# Patient Record
Sex: FEMALE | Race: WHITE | NOT HISPANIC OR LATINO | Employment: FULL TIME | URBAN - METROPOLITAN AREA
[De-identification: names, ages, dates, MRNs, and addresses within clinical notes are randomized per-mention and may not be internally consistent; named-entity substitution may affect disease eponyms.]

---

## 2017-10-24 DIAGNOSIS — I47.20 VT (VENTRICULAR TACHYCARDIA): ICD-10-CM

## 2017-10-24 DIAGNOSIS — I45.10 COMPLETE RIGHT BUNDLE BRANCH BLOCK: Primary | ICD-10-CM

## 2017-10-24 DIAGNOSIS — I49.1 PAC (PREMATURE ATRIAL CONTRACTION): ICD-10-CM

## 2017-10-24 DIAGNOSIS — I49.3 PVC (PREMATURE VENTRICULAR CONTRACTION): ICD-10-CM

## 2017-10-25 ENCOUNTER — OFFICE VISIT (OUTPATIENT)
Dept: PEDIATRIC CARDIOLOGY | Facility: CLINIC | Age: 22
End: 2017-10-25
Payer: COMMERCIAL

## 2017-10-25 ENCOUNTER — CLINICAL SUPPORT (OUTPATIENT)
Dept: PEDIATRIC CARDIOLOGY | Facility: CLINIC | Age: 22
End: 2017-10-25
Payer: COMMERCIAL

## 2017-10-25 VITALS
HEART RATE: 60 BPM | HEIGHT: 63 IN | WEIGHT: 175.19 LBS | OXYGEN SATURATION: 98 % | DIASTOLIC BLOOD PRESSURE: 58 MMHG | BODY MASS INDEX: 31.04 KG/M2 | SYSTOLIC BLOOD PRESSURE: 105 MMHG | RESPIRATION RATE: 20 BRPM

## 2017-10-25 DIAGNOSIS — Z82.49 FAMILY HISTORY OF EARLY CAD: ICD-10-CM

## 2017-10-25 DIAGNOSIS — I49.1 PAC (PREMATURE ATRIAL CONTRACTION): ICD-10-CM

## 2017-10-25 DIAGNOSIS — Z82.49 FAMILY HISTORY OF HEART ATTACK: Primary | ICD-10-CM

## 2017-10-25 DIAGNOSIS — R00.2 PALPITATION: ICD-10-CM

## 2017-10-25 DIAGNOSIS — Z86.79 S/P RADIOFREQUENCY ABLATION OPERATION FOR ARRHYTHMIA: ICD-10-CM

## 2017-10-25 DIAGNOSIS — I47.20 VT (VENTRICULAR TACHYCARDIA): ICD-10-CM

## 2017-10-25 DIAGNOSIS — I45.10 COMPLETE RIGHT BUNDLE BRANCH BLOCK: ICD-10-CM

## 2017-10-25 DIAGNOSIS — I49.3 PVC (PREMATURE VENTRICULAR CONTRACTION): ICD-10-CM

## 2017-10-25 DIAGNOSIS — Z98.890 S/P RADIOFREQUENCY ABLATION OPERATION FOR ARRHYTHMIA: ICD-10-CM

## 2017-10-25 PROCEDURE — 93227 XTRNL ECG REC<48 HR R&I: CPT | Mod: S$GLB,,, | Performed by: PEDIATRICS

## 2017-10-25 PROCEDURE — 99215 OFFICE O/P EST HI 40 MIN: CPT | Mod: S$GLB,,, | Performed by: PHYSICIAN ASSISTANT

## 2017-10-25 PROCEDURE — 93000 ELECTROCARDIOGRAM COMPLETE: CPT | Mod: S$GLB,,, | Performed by: PEDIATRICS

## 2017-10-25 RX ORDER — DEXMETHYLPHENIDATE HYDROCHLORIDE 10 MG/1
10 CAPSULE, EXTENDED RELEASE ORAL DAILY
COMMUNITY
End: 2019-02-26

## 2017-10-25 NOTE — LETTER
October 26, 2017      Luciano Perez MD  [address]  [facility] [address]           [facility]  [address]  [facility] [address]  Phone: [phone]  Fax: [phone]          Patient: Belle Cid   MR Number: [id number]   YOB: 1995   Date of Visit: 10/25/2017       Dear Dr. Brooks Conroy:    Thank you for referring Belle Cid to me for evaluation. Attached you will find relevant portions of my assessment and plan of care.    If you have questions, please do not hesitate to call me. I look forward to following Belle Cid along with you.    Sincerely,    Harper Martinez PA-C    Enclosure  CC:  Guzman Currie MD    If you would like to receive this communication electronically, please contact [email or web address] or [phone] to request more information on [employer] Link access.    For providers and/or their staff who would like to refer a patient to [facility], please contact us through our one-stop-shop provider referral line, [facility], at [phone].    If you feel you have received this communication in error or would no longer like to receive these types of communications, please e-mail [email or web address]

## 2017-10-25 NOTE — PROGRESS NOTES
[facility]  Belle Cid  1995      Belle Cid is a 22 y.o. female presenting for follow-up of     VT (ventricular tachycardia)      Complete right bundle branch block      PVC (premature ventricular contraction)      .  Belle is here today unaccompanied.     HPI    Belle was noted to have frequent cardiac ectopy during physical exam for sinus surgery in May 2006. An echo and holter were obtained. Holter revealed a lot of premature beats (junctional vs PVCs) and one suspect 3 beat VT run. She was hospitalized and monitored, had PVCs > 800 per hour, and transferred to [facility]. She had EP study by Dr. Kellogg 8/22/06, diagnosed with RVOT VT, and had multiple lesions ablated with no PVCs noted post-ablation. She was hospitalized again May 2007 with 3 beat VT run on holter. May 2008, she experienced syncope with possible VT episode. She was transferred to [facility] for evaluation and EP study, which was performed 5/20/08 with 13 complete RF lesions > 20 seconds and partial success. Implantable loop recorder was implanted at that time.   Shortly after, she was admitted to [facility] with swelling of her right leg and evidence of an active clot in her thigh. She was anticoagulated with heparin and coumadin. 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 Puentes.     Lpa was elevated in 2013. It was recommended that she take OTC MegaRed. She has not been taking MegaRed.     She was last seen by Dr. Conroy 10/26/16 and there were no concerns reported that day. Her EKG showed CRBBB and Low voltage. Echo and holter were scheduled. She was asked to follow up in 1 year and see Dr. Currie in 1-2 months. Echo December 2016 showed normal findings. Holter December 2016 showed Sinus rhythm with heart rates varying between 41 and 110 bpm with an average of 62 bpm,  bundle branch block throughout, there were no PVCs, and there was no ventricular ectopic activity. She saw Dr. Currie on 12/18/16 and impression was h/o PVC's and VT s/p ablation attempt in past controlled on Sotalol without significant symptoms. Plan was Holter today, continue sotalol 40 mg qhs, continue with staying well hydrated, discuss with Dr. Conroy whether there is benefit to being on baby aspirin, will revisit concept of redo ablation attempt at some point in next 1-2 years, and F/u in EP clinic in 6 months with ECG at that time. She is late for follow up with Dr. Currie . She reports she initially took ASA but has not been taking it recently per report.     She followed by Faith Joshi at [facility] for ADHD on Focalin 10 mg M-F for the past year.     She has done well in past 4-5 years without palpations until September 2017. She developed palpations described as heart flutter, skipped beats, and pounding. She states she thinks she had an episode of VT on September 30th. She states she felt her heart racing while she was sitting on the couch. She thinks she had about a 10 second run on VT. No syncope, chest pain, ect with the palpations. She was drinking caffeine daily. She was also under a lot of stress during the time due to her  having surgery. She is now drinking decaf. She has only had 2 episodes of heart fluttering over the past 2 weeks since switching to decaf.      No history of syncope, fatigue, or chest pain. Denies any recent illness, surgeries, or hospitalizations.    There are no reports of chest pain, chest pain with exertion, cyanosis, exercise intolerance, dyspnea, fatigue, syncope and tachypnea. No other cardiovascular or medical concerns are reported.     Current Medications:   Previous Medications    DEXMETHYLPHENIDATE (FOCALIN XR) 10 MG 24 HR CAPSULE    Take 10 mg by mouth once daily.    NORETHINDRONE-E.ESTRADIOL-IRON (LO LOESTRIN FE ORAL)    Take 1 tablet by  mouth once daily.    SOTALOL (BETAPACE) 80 MG TABLET    Take 0.5 tablets (40 mg total) by mouth nightly.     Allergies:   Review of patient's allergies indicates:   Allergen Reactions    Dye        Family History   Problem Relation Age of Onset    No Known Problems Mother     Hypertension Father     Hypertension Maternal Grandmother     Heart attacks under age 50 Maternal Grandfather     Coronary artery disease Maternal Grandfather      bypass    Diabetes Maternal Grandfather     Hypertension Maternal Grandfather     Arrhythmia Maternal Aunt      PVCs    Arrhythmia Other      PVCs    Cardiomyopathy Neg Hx     Congenital heart disease Neg Hx     Early death Neg Hx     Long QT syndrome Neg Hx     Pacemaker/defibrilator Neg Hx      Past Medical History:   Diagnosis Date    Blood clot in vein     history of right femoral vein clot (2008)    Complete right bundle branch block     Family history of coronary artery disease     maternal grandfather    PAC (premature atrial contraction)     PVC (premature ventricular contraction)     VT (ventricular tachycardia)     s/p multiple ablations     Social History     Social History    Marital status:      Spouse name: N/A    Number of children: N/A    Years of education: N/A     Social History Main Topics    Smoking status: Never Smoker    Smokeless tobacco: None    Alcohol use No    Drug use: No    Sexual activity: Yes     Partners: Male     Other Topics Concern    None     Social History Narrative    She has 1 y/o baby.  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  Has 8 y/o step daughter as well.     Past Surgical History:   Procedure Laterality Date    CARDIAC ELECTROPHYSIOLOGY MAPPING AND ABLATION  05/20/2008    ACH: 13 complete RF lesions > 20 seconds and partial success    CARDIAC ELECTROPHYSIOLOGY MAPPING AND ABLATION  08/25/2006    [facility]: RVOT VT, and had multiple lesions ablated    LOOP RECORDER IMPLANT  05/20/2008  "   ACH: Loop recorder implant    LOOP RECORDER REMOVAL  01/2012    [facility]: Loop recorder removal       Past medical history, family history, surgical history, social history updated and reviewed today.     Review of Systems    GENERAL: No fever, chills, fatigability, malaise  or weight loss.  CHEST: Denies PARKS, cyanosis, wheezing, cough, sputum production or SOB.  CARDIOVASCULAR: + palpitations, Denies chest pain, diaphoresis, SOB, or reduced exercise tolerance.  Endocrine: Denies polyphagia, polydipsia, polyuria  Skin: Denies rashes or color change  HENT: Negative for congestion, headaches and sore throat.   ABDOMEN: Appetite fine. No weight loss. Denies diarrhea, abdominal pain, nausea or vomiting.  PERIPHERAL VASCULAR: No edema, varicosities, or cyanosis.  Musculoskeletal: Negative for muscle weakness and stiffness.  NEUROLOGIC: no dizziness, no history of syncope by report, no headache   Psychiatric/Behavioral: Negative for altered mental status. The patient is not nervous/anxious.   Allergic/Immunologic: Negative for environmental allergies.     Objective:   BP (!) 105/58 (BP Location: Right arm, Patient Position: Sitting, BP Method: Large (Automatic))   Pulse 60   Resp 20   Ht 5' 2.99" (1.6 m)   Wt 79.5 kg (175 lb 3 oz)   SpO2 98%   BMI 31.04 kg/m²     Physical Exam  GENERAL: Awake, well-developed well-nourished, no apparent distress  HEENT: mucous membranes moist and pink, normocephalic, no cranial or carotid bruits, sclera anicteric  NECK:  no lymphadenopathy  CHEST: Good air movement, clear to auscultation bilaterally  CARDIOVASCULAR: Quiet precordium, regular rate and rhythm, single S1, split S2, normal P2, No S3 or S4, no rubs or gallops. No clicks or rumbles. No cardiomegaly by palpation. No murmur noted. No PVCs noted on exam.   ABDOMEN: Soft, nontender nondistended, no hepatosplenomegaly, no aortic bruits  EXTREMITIES: Warm well perfused, 2+ radial/pedal/femoral, pulses, capillary refill 2 " seconds, no clubbing, cyanosis, or edema  NEURO: Alert and oriented, cooperative with exam, face symmetric, moves all extremities well.  Skin: pink, turgor WNL  Vitals reviewed     Tests:   Today's EKG interpretation by Dr. Conroy reveals:   NSR  CRBBB  Low voltage  abnormal  (Final report in electronic medical record)    Echocardiogram:   Pertinent Echocardiographic findings from the Echo dated 12/13/16 are:   There are 4 chambers with normally aligned great vessels.  Chamber sizes are qualitatively normal.  There is good LV function.  There are no shunts noted.  Physiological TR, PI, MR.  The right coronary artery and left coronary are patent by 2D.  RVSP 25 mm Hg  TAPSE WNL  LV tissue doppler WNL  LA Volumes WNL  Clinical Correlation Suggested  Follow Up Warranted  (Full report in electronic medical record)    Date of Procedure: 12/13/2016  PRE-TEST DATA   The diary was returned, but not completed.  TEST DESCRIPTION   PREDOMINANT RHYTHM  1. Sinus rhythm with heart rates varying between 41 and 110 bpm with an average of 62 bpm.   2. Bundle branch block throughout  VENTRICULAR ARRHYTHMIAS  1. There were no PVCs. There was no ventricular ectopic activity.  SUPRA VENTRICULAR ARRHYTHMIAS  1. There was no supraventricular ectopic activity.  SINUS NODE FUNCTION  1. There was no evidence of high grade SA radha block.   AV CONDUCTION  1. There was no evidence of high grade AV block.   DIARY  1. The diary was returned, but not completed  MISCELLANEOUS  1. This was a tape of adequate length (24 hrs).     Assessment:  Patient Active Problem List   Diagnosis    VT (ventricular tachycardia)    Complete right bundle branch block    PVC (premature ventricular contraction)    PAC (premature atrial contraction)    S/P radiofrequency ablation operation for arrhythmia    Palpitation    Family history of early CAD    Family history of heart attack       Discussion/ Plan:   Dr. Conroy reviewed history and physical exam. He then  performed the physical exam. He discussed the findings with the patient's caregiver(s), and answered all questions    Dr. Conroy and HYACINTH have reviewed our general guidelines related to cardiac issues with the family.  I instructed them in the event of an emergency to call 911 or go to the nearest emergency room.  They know to contact the PCP if problems arise or if they are in doubt.    Dr. Conroy would like to do a 48 hour holter monitor to get a PVC density due to her history of VT and new palpations. He would then like her to return next week for a 30 day event monitor since she reports and episode similar to VT. Dr. Conroy would like her to discontinue all caffeine products including decaf which still contains caffeine. He would like her continue her current dose of Sotalol. He would like her to follow up with Dr. Currie.  Because of her history of a clot,  Dr. Conroy would like her to take 81 mg ASA daily. Discussed that she should stop the ASA if she become pregnant. He would like to recheck her lipids and LPa which was elevated in the past and due to her family history of MI and early CAD. Dr. Conroy and I have discussed normal heart rate and rhythm, physiological tachycardia, and cardiac dysrhythmias. We have discussed red flags for dysrhythmias including sudden onset and sudden resolution, heart rates which wake her  up from sleep during the night, tachycardia associated with syncope or which lasts for a long time, and heart rates which are very high. In the event that Belle has loss of consciousness or is unresponsive, you should call 911, initiate CPR and notify parents or legal guardian. Discussed signs and symptoms to seek medical attention about when to alert us. Belle expressed understanding. Will consider repeating her echo at her next visit. Dr. Conryo would like to repeat the EKG at the next visit to monitor for changes.     I spent over 45 minutes with the patient. Over 50% of the time was spent counseling  the patient  on palpations, VT s/p ablation , PVC, CRBBB, Lipids, LPa, family history of CAD and MI      1. Activity: No strenuous activities at this time. Will revisit after studies are complete.       2. No endocarditis prophylaxis is recommended in this circumstance.     3. Medications:   Current Outpatient Prescriptions   Medication Sig    dexmethylphenidate (FOCALIN XR) 10 MG 24 hr capsule Take 10 mg by mouth once daily.    NORETHINDRONE-E.ESTRADIOL-IRON (LO LOESTRIN FE ORAL) Take 1 tablet by mouth once daily.    sotalol (BETAPACE) 80 MG tablet Take 0.5 tablets (40 mg total) by mouth nightly.     No current facility-administered medications for this visit.         4. Orders placed this encounter  Orders Placed This Encounter   Procedures    Lipid panel    Lipoprotein A (LPA)    Holter Monitor - 48 hour Pediatrics    Cardiac event monitor Pediatrics         Follow-Up:     Return to clinic in 6 months with EKG pending testing and f.u with Dr. Currie 1st available  or sooner if there are any concerns      Sincerely,  Brooks Conroy MD    Note Contributing Authors:  MD Harper Montero PA-C  10/26/2017    Attestation: Brooks Conroy MD    I have reviewed the records and agree with the above. I have examined the patient and discussed the findings with the family in attendance. All questions were answered to their satisfaction. I agree with the plan and the follow up instructions.

## 2017-10-25 NOTE — PATIENT INSTRUCTIONS
Brooks Conroy MD  Pediatric Cardiology  [address] [place] [address]  [phone]    General Guidelines    Name: Belle Cid                   : 1995    Diagnosis:   1. Complete right bundle branch block    2. VT (ventricular tachycardia)    3. PAC (premature atrial contraction)    4. PVC (premature ventricular contraction)    5. Palpitation        PCP: Luciano Perez MD  PCP Phone Number: [phone]    · If you have an emergency or you think you have an emergency, go to the nearest emergency room!     · Breathing too fast, doesnt look right, consistently not eating well, your child needs to be checked. These are general indications that your child is not feeling well. This may be caused by anything, a stomach virus, an ear ache or heart disease, so please call Luciano Preez MD. If Luciano Perez MD thinks you need to be checked for your heart, they will let us know.     · If your child experiences a rapid or very slow heart rate and has the following symptoms, call Luciano Perez MD or go to the nearest emergency room.   · unexplained chest pain   · does not look right   · feels like they are going to pass out   · actually passes out for unexplained reasons   · weakness or fatigue   · shortness of breath  or breathing fast   · consistent poor feeding     · If your child experiences a rapid or very slow heart rate that lasts longer than 30 minutes call Luciano Perez MD or go to the nearest emergency room.     · If your child feels like they are going to pass out - have them sit down or lay down immediately. Raise the feet above the head (prop the feet on a chair or the wall) until the feeling passes. Slowly allow the child to sit, then stand. If the feeling returns, lay back down and start over.     It is very important that you notify Luciano Perez MD first. Luciano Perez MD or the ER Physician can reach Dr. Brooks Conroy at the office or through [facility] PICU at [phone] as needed.    Call our office ([phone]) one week after ALL tests for results.       Take 81 mg Asprin daily.   Call one week after lab work  48 hour holter today.   Follow up with Dr. Currie

## 2017-10-30 ENCOUNTER — CLINICAL SUPPORT (OUTPATIENT)
Dept: PEDIATRIC CARDIOLOGY | Facility: CLINIC | Age: 22
End: 2017-10-30
Payer: COMMERCIAL

## 2017-10-30 DIAGNOSIS — I47.20 VT (VENTRICULAR TACHYCARDIA): ICD-10-CM

## 2017-10-30 DIAGNOSIS — I49.1 PAC (PREMATURE ATRIAL CONTRACTION): ICD-10-CM

## 2017-10-30 DIAGNOSIS — I45.10 COMPLETE RIGHT BUNDLE BRANCH BLOCK: ICD-10-CM

## 2017-10-30 DIAGNOSIS — R00.2 PALPITATION: ICD-10-CM

## 2017-10-30 DIAGNOSIS — I49.3 PVC (PREMATURE VENTRICULAR CONTRACTION): ICD-10-CM

## 2017-10-30 PROCEDURE — 93268 ECG RECORD/REVIEW: CPT | Mod: S$GLB,,, | Performed by: PEDIATRICS

## 2017-10-31 DIAGNOSIS — I47.20 VT (VENTRICULAR TACHYCARDIA): Primary | ICD-10-CM

## 2017-11-06 ENCOUNTER — DOCUMENTATION ONLY (OUTPATIENT)
Dept: PEDIATRIC CARDIOLOGY | Facility: CLINIC | Age: 22
End: 2017-11-06

## 2017-11-06 NOTE — PROGRESS NOTES
Called to discuss holter and labs results.  She will restart OTC MegaRed and encouraged healthy lifestyle. Literature mailed. She is wearing her event monitor now. Will see Dr. Currie in 2 weeks. Will keep us updated.      1027/17  Cholesterol 164 WNL  Triglycerides 76 WNL  AHDL 48 WNL  .8 H (normal 0-100)  HDL risk factor 3.5 (normal 0-4.9) (average 4.44)  LPa 89 H (normal < 75)    Date of Procedure: 10/25/2017  PRE-TEST DATA   The diary was sparsely completed.  TEST DESCRIPTION   PREDOMINANT RHYTHM  1. Sinus rhythm with heart rates varying between 43 and 153 bpm with an average of 66 bpm.   2. Bundle branch block throughout  VENTRICULAR ARRHYTHMIAS  1. There were no PVCs. There was no ventricular ectopic activity.  SUPRA VENTRICULAR ARRHYTHMIAS  1. There were very rare PACs recorded totalling 2 and averaging less than 1 per hour.   2. There were no episodes of sustained supraventricular tachycardia.  SINUS NODE FUNCTION  1. There was no evidence of high grade SA radha block.   AV CONDUCTION  1. There was no evidence of high grade AV block.   DIARY  1. The diary was sparsely completed.   2. There were 2 episodes of heart fluttering reported. The corresponding rhythm strips revealed the following:             During event 1 (17:00 day 1) the rhythm was sinus rhythm at 66 bpm.             During event 2 (14:21 day 3) the rhythm was sinus rhythm at 63 bpm, with PACs.   3. There was 1 episode of heart fluttering/ skips beat reported. The corresponding rhythm strips revealed the following:             During event 1 (20:00 day 2) the rhythm was sinus rhythm at 85 bpm.   4. There was 1 episode of skips beat / hot flash reported. The corresponding rhythm strips revealed the following:             During event 1 (14:20 day 3) the rhythm was sinus rhythm at 73 bpm.   MISCELLANEOUS  1. This was a tape of adequate length (48 hrs).   This document has been electronically    SIGNED BY: Kim Serrato MD On:  11/02/2017 15:56

## 2017-11-20 ENCOUNTER — OFFICE VISIT (OUTPATIENT)
Dept: PEDIATRIC CARDIOLOGY | Facility: CLINIC | Age: 22
End: 2017-11-20
Payer: COMMERCIAL

## 2017-11-20 VITALS
HEART RATE: 50 BPM | BODY MASS INDEX: 30.75 KG/M2 | RESPIRATION RATE: 20 BRPM | SYSTOLIC BLOOD PRESSURE: 105 MMHG | WEIGHT: 173.56 LBS | HEIGHT: 63 IN | DIASTOLIC BLOOD PRESSURE: 58 MMHG | OXYGEN SATURATION: 99 %

## 2017-11-20 DIAGNOSIS — I49.1 PAC (PREMATURE ATRIAL CONTRACTION): ICD-10-CM

## 2017-11-20 DIAGNOSIS — Z86.79 S/P RADIOFREQUENCY ABLATION OPERATION FOR ARRHYTHMIA: Primary | ICD-10-CM

## 2017-11-20 DIAGNOSIS — I47.20 VT (VENTRICULAR TACHYCARDIA): ICD-10-CM

## 2017-11-20 DIAGNOSIS — I49.3 PVC (PREMATURE VENTRICULAR CONTRACTION): ICD-10-CM

## 2017-11-20 DIAGNOSIS — Z98.890 S/P RADIOFREQUENCY ABLATION OPERATION FOR ARRHYTHMIA: Primary | ICD-10-CM

## 2017-11-20 DIAGNOSIS — R00.2 PALPITATION: ICD-10-CM

## 2017-11-20 DIAGNOSIS — I45.10 COMPLETE RIGHT BUNDLE BRANCH BLOCK: ICD-10-CM

## 2017-11-20 PROCEDURE — 99214 OFFICE O/P EST MOD 30 MIN: CPT | Mod: 25,S$GLB,, | Performed by: PEDIATRICS

## 2017-11-20 PROCEDURE — 93000 ELECTROCARDIOGRAM COMPLETE: CPT | Mod: S$GLB,,, | Performed by: PEDIATRICS

## 2017-11-20 RX ORDER — SOTALOL HYDROCHLORIDE 80 MG/1
40 TABLET ORAL NIGHTLY
Qty: 45 TABLET | Refills: 3 | Status: SHIPPED | OUTPATIENT
Start: 2017-11-20 | End: 2019-02-06 | Stop reason: SDUPTHER

## 2017-11-20 NOTE — LETTER
November 20, 2017        Brooks Conroy MD  [address] - [facility]  [address]  [facility] [address]  Phone: [phone]  Fax: [phone]   Patient: Belle Cid   MR Number: [id number]   YOB: 1995   Date of Visit: 11/20/2017       Dear Dr. Conroy:    Thank you for referring Belle Cid to me for evaluation. Attached you will find relevant portions of my assessment and plan of care.    If you have questions, please do not hesitate to call me. I look forward to following Belle Cid along with you.    Sincerely,      Guzman Currie MD            CC  Luciano Perez MD    Enclosure

## 2017-11-20 NOTE — PROGRESS NOTES
Thank you for referring your patient Belle Cid to the electrophysiology clinic for consultation.  Please review my findings below.    CHIEF COMPLAINT: H/o PVC/VT ablation f/u.    HISTORY OF PRESENT ILLNESS:  21 y/o with history of PVC's and VT s/p ablation attempt by Dr. Kellogg.  She has subsequently been managed on Sotalol.      Belle was last seen by me in December 2016.  Felt like she was having fluttering from May -September. Had Holter in October that was normal. Presently wearing Event monitor. Pressed button couple times since been on. Feeling better now. Feels like it was stress related.  She finally feels like she is getting back to normal.  She has far fewer palpitations.  She has none in the past 2 weeks.   She has no dizziness, syncope, or near syncope.  She has good energy and good activity tolerance.  She has no difficulty breathing or chest pain.      REVIEW OF SYSTEMS:     GENERAL: No fever, chills, fatigability or weight loss.  SKIN: No rashes, itching or changes in color or texture of skin.  CHEST: Denies PARKS, cyanosis, wheezing, cough and sputum production.  CARDIOVASCULAR: see HPI  ABDOMEN: Appetite fine. No weight loss. Denies diarrhea, abdominal pain, or vomiting.  PERIPHERAL VASCULAR: No claudication or cyanosis.  MUSCULOSKELETAL: No joint stiffness or swelling.   NEUROLOGIC: No history of seizures,  alteration of gait or coordination.    PAST MEDICAL HISTORY:   Past Medical History:   Diagnosis Date    Blood clot in vein     history of right femoral vein clot (2008)    Complete right bundle branch block     Family history of coronary artery disease     maternal grandfather    PAC (premature atrial contraction)     PVC (premature ventricular contraction)     VT (ventricular tachycardia)     s/p multiple ablations           FAMILY HISTORY:   Family History   Problem Relation Age of Onset    No Known Problems Mother     Hypertension Father     Hypertension Maternal Grandmother      "Heart attacks under age 50 Maternal Grandfather     Coronary artery disease Maternal Grandfather      bypass    Diabetes Maternal Grandfather     Hypertension Maternal Grandfather     Arrhythmia Maternal Aunt      PVCs    Arrhythmia Other      PVCs    Cardiomyopathy Neg Hx     Congenital heart disease Neg Hx     Early death Neg Hx     Long QT syndrome Neg Hx     Pacemaker/defibrilator Neg Hx          SOCIAL HISTORY:   Social History     Social History    Marital status:      Spouse name: N/A    Number of children: N/A    Years of education: N/A     Occupational History    Not on file.     Social History Main Topics    Smoking status: Never Smoker    Smokeless tobacco: Not on file    Alcohol use No    Drug use: No    Sexual activity: Yes     Partners: Male     Other Topics Concern    Not on file     Social History Narrative    She has 3 y/o baby.  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  Has 8 y/o step daughter as well.       ALLERGIES:  Review of patient's allergies indicates:   Allergen Reactions    Dye        MEDICATIONS:    Current Outpatient Prescriptions:     dexmethylphenidate (FOCALIN XR) 10 MG 24 hr capsule, Take 10 mg by mouth once daily., Disp: , Rfl:     NORETHINDRONE-E.ESTRADIOL-IRON (LO LOESTRIN FE ORAL), Take 1 tablet by mouth once daily., Disp: , Rfl:     OMEGA-3 FATTY ACIDS/FISH OIL (OMEGA 3 FISH OIL ORAL), Take 1 capsule by mouth once daily., Disp: , Rfl:     sotalol (BETAPACE) 80 MG tablet, Take 0.5 tablets (40 mg total) by mouth nightly., Disp: 30 tablet, Rfl: 6      PHYSICAL EXAM:   Vitals:    11/20/17 1040   BP: (!) 105/58   BP Location: Right arm   Patient Position: Lying   BP Method: Large (Automatic)   Pulse: (!) 50   Resp: 20   SpO2: 99%   Weight: 78.7 kg (173 lb 9 oz)   Height: 5' 2.56" (1.589 m)         GENERAL: Awake, well-developed well-nourished, no apparent distress  HEENT: mucous membranes moist and pink, normocephalic atraumatic, no " cranial or carotid bruits, sclera anicteric  NECK: no jugular venous distention, no lymphadenopathy  CHEST: Good air movement, clear to auscultation bilaterally  CARDIOVASCULAR: Quiet precordium, regular rate and rhythm, S1S2, no murmurs rubs or gallops  ABDOMEN: Soft, nontender nondistended, no hepatomegaly, no aortic bruits  EXTREMITIES: Warm well perfused, 2+ radial/pedal pulses, capillary refill 2 seconds, no clubbing, cyanosis, or edema.  Right leg pain rarely with weather changes.  NEURO: Alert and oriented, cooperative with exam, face symmetric, moves all extremities well    STUDIES:  ECG:  Sinus bradycardia, right bundle branch block    ASSESSMENT:  Encounter Diagnoses   Name Primary?    VT (ventricular tachycardia)      21 y/o female with h/o PVC's and VT s/p ablation attempt in past.  She has been controlled on Sotalol without significant symptoms.      PLAN:   1. Place 3 day Holter today.  2. Continue sotalol 40 mg qhs, could increase to 80 if starts having more palpitations again.  3. Continue with staying well hydrated.  4. Will revisit concept of redo ablation attempt at some point in future but not ready for now.  6. F/u in EP clinic in 6 months with ECG at that time.  7. Call for worsening palpitations, chest pain, syncope, or any other questions or concerns in the interim.    Time Spent: 40 (min) with over 50% in direct patient consultation regarding management of PVC's and VT.      The patient's doctor will be notified via EPIC/FAX    I hope this brings you up-to-date on Belle Jose Roberto  Please contact me with any questions or concerns.    Guzman Currie MD  Pediatric and Adult Congenital Electrophysiologist  Pediatric Cardiologist

## 2019-02-08 RX ORDER — SOTALOL HYDROCHLORIDE 80 MG/1
TABLET ORAL
Qty: 45 TABLET | Refills: 3 | Status: SHIPPED | OUTPATIENT
Start: 2019-02-08 | End: 2020-04-01 | Stop reason: SDUPTHER

## 2019-02-26 ENCOUNTER — OFFICE VISIT (OUTPATIENT)
Dept: PEDIATRIC CARDIOLOGY | Facility: CLINIC | Age: 24
End: 2019-02-26
Payer: COMMERCIAL

## 2019-02-26 VITALS
OXYGEN SATURATION: 98 % | HEART RATE: 67 BPM | WEIGHT: 164.25 LBS | BODY MASS INDEX: 29.1 KG/M2 | HEIGHT: 63 IN | SYSTOLIC BLOOD PRESSURE: 110 MMHG | RESPIRATION RATE: 20 BRPM | DIASTOLIC BLOOD PRESSURE: 53 MMHG

## 2019-02-26 DIAGNOSIS — I47.20 VT (VENTRICULAR TACHYCARDIA): Primary | ICD-10-CM

## 2019-02-26 DIAGNOSIS — Z82.49 FAMILY HISTORY OF HEART ATTACK: ICD-10-CM

## 2019-02-26 DIAGNOSIS — Z3A.01 LESS THAN 8 WEEKS GESTATION OF PREGNANCY: ICD-10-CM

## 2019-02-26 DIAGNOSIS — Z82.49 FAMILY HISTORY OF EARLY CAD: ICD-10-CM

## 2019-02-26 DIAGNOSIS — Z86.79 S/P RADIOFREQUENCY ABLATION OPERATION FOR ARRHYTHMIA: ICD-10-CM

## 2019-02-26 DIAGNOSIS — R00.2 PALPITATION: ICD-10-CM

## 2019-02-26 DIAGNOSIS — Z98.890 S/P RADIOFREQUENCY ABLATION OPERATION FOR ARRHYTHMIA: ICD-10-CM

## 2019-02-26 DIAGNOSIS — I45.10 COMPLETE RIGHT BUNDLE BRANCH BLOCK: ICD-10-CM

## 2019-02-26 PROCEDURE — 93000 PR ELECTROCARDIOGRAM, COMPLETE: ICD-10-PCS | Mod: S$GLB,,, | Performed by: PEDIATRICS

## 2019-02-26 PROCEDURE — 99214 OFFICE O/P EST MOD 30 MIN: CPT | Mod: S$GLB,,, | Performed by: NURSE PRACTITIONER

## 2019-02-26 PROCEDURE — 99214 PR OFFICE/OUTPT VISIT, EST, LEVL IV, 30-39 MIN: ICD-10-PCS | Mod: S$GLB,,, | Performed by: NURSE PRACTITIONER

## 2019-02-26 PROCEDURE — 93000 ELECTROCARDIOGRAM COMPLETE: CPT | Mod: S$GLB,,, | Performed by: PEDIATRICS

## 2019-02-26 NOTE — LETTER
February 27, 2019      Luciano Perez MD  [address] [place], [place] [address] [place] [address]  [address]  [facility] [address]  Phone: [phone]  Fax: [phone]          Patient: Belle Cid   MR Number: [id number]   YOB: 1995   Date of Visit: 2/26/2019       Dear Dr. Luciano Perez:    Thank you for referring Belle Cid to me for evaluation. Attached you will find relevant portions of my assessment and plan of care.    If you have questions, please do not hesitate to call me. I look forward to following Belle Cid along with you.    Sincerely,    Levon Church, [place]    Enclosure  CC:  No Recipients    If you would like to receive this communication electronically, please contact [email or web address] or [phone] to request more information on [facility] access.    For providers and/or their staff who would like to refer a patient to [facility], please contact us through our one-stop-shop provider referral line, [facility], at [phone].    If you feel you have received this communication in error or would no longer like to receive these types of communications, please e-mail [email or web address]

## 2019-02-26 NOTE — PROGRESS NOTES
[facility]  Belle Cid  1995    Belle Cid is a 23 y.o. female presenting for follow-up of VT, CRBBB, and PVCs.  Belle is here today unaccompanied.  She is now 5 weeks pregnant.    ADILSON Odonnell was noted to have frequent cardiac ectopy during physical exam for sinus surgery in May 2006. An echo and holter were obtained. Holter revealed a lot of premature beats (junctional vs PVCs) and one suspect 3 beat VT run. She was hospitalized and monitored, had PVCs > 800 per hour, and transferred to [facility]. She had EP study by Dr. Kellogg 8/22/06, diagnosed with RVOT VT, and had multiple lesions ablated with no PVCs noted post-ablation. She was hospitalized again May 2007 with 3 beat VT run on holter. May 2008, she experienced syncope with possible VT episode. She was transferred to [facility] for evaluation and EP study, which was performed 5/20/08 with 13 complete RF lesions > 20 seconds and partial success. Implantable loop recorder was placed at that time.   Shortly after, she was admitted to [facility] with swelling of her right leg and evidence of an active clot in her thigh. She was anticoagulated with heparin and coumadin. 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 Loop recorder removed in January 2012 by Dr. Puentes.     She was last seen in October 2017 and at that time was doing well. She had done well without palpations until September 2017. She developed palpations described as heart flutter, skipped beats, and pounding. She stated she thinks she had an episode of VT on 9/30/2018. She stated she felt her heart racing while she was sitting on the couch. She thought she had about a 10 second run on VT. No syncope, chest pain, etc with the palpations. She was drinking caffeine daily. She was also under a lot of stress during the time due to her  having surgery and some family  issues.. She had switched to drinking decaf which has significantly decreased her symptoms.  Her exam that day revealed normal heart sounds and no murmur.  48 hr Holter was placed with plan for 30 day event monitor after the Holter.  She was asked to continue on the sotalol and take 81 mg of aspirin daily.  Lipids and LPA  She was asked to follow up with Dr. Currie.     LPA was elevated at 89.  She was asked to restart Clayton red and encouraged healthy lifestyle.  Holter was normal.  Event monitor was normal.  Sensed events were not associated with any dysrhythmias.    She saw Dr. Currie in November of 2017.  Impression:  22-year-old female with history of PVCs in VT status post ablation attempt in the past.  On sotalol without significant symptoms.  Plan:  3 day Holter, continue sotalol and consider increasing the dose if she has palpitations again, stay well hydrated.     Belle has been doing well since last visit. She is currently 5 weeks pregnant. She saw the obstetrician today. She took Sotalol through her previous pregnancy and they agreed to continue it.  Belle has a lot of energy and does not get short of breath with activity. She was seen in the [place] [facility] recently for palpitations that occurred after using a nasal decongestant. She has since stopped the medication and has improved.  She does complain of dizziness from position change or climbing stairs.  She is drinking 80-90 oz of bottle water now without any caffeine intake.  I encouraged her to add a fluid with electrolytes to see if this would improve her symptoms.    There are no reports of chest pain, chest pain with exertion, cyanosis, exercise intolerance, dyspnea, fatigue, palpitations, syncope and tachypnea. No other cardiovascular or medical concerns are reported.     Current Medications:   Current Outpatient Medications on File Prior to Visit   Medication Sig Dispense Refill    OMEGA-3 FATTY ACIDS/FISH OIL (OMEGA 3 FISH OIL ORAL) Take 1  capsule by mouth once daily.      sotalol (BETAPACE) 80 MG tablet TAKE 1/2 TABLET BY MOUTH nightly 45 tablet 3     No current facility-administered medications on file prior to visit.      Allergies:   Review of patient's allergies indicates:   Allergen Reactions    Dye          Family History   Problem Relation Age of Onset    Fibromyalgia Mother     Meniere's disease Mother     Hypertension Father     Hypertension Maternal Grandmother     Heart attacks under age 50 Maternal Grandfather     Coronary artery disease Maternal Grandfather         bypass    Diabetes Maternal Grandfather     Hypertension Maternal Grandfather     Arrhythmia Maternal Aunt         PVCs    Arrhythmia Other         PVCs    No Known Problems Child     Cardiomyopathy Neg Hx     Congenital heart disease Neg Hx     Early death Neg Hx     Long QT syndrome Neg Hx     Pacemaker/defibrilator Neg Hx      Past Medical History:   Diagnosis Date    Blood clot in vein     history of right femoral vein clot (2008)    Complete right bundle branch block     Family history of coronary artery disease     maternal grandfather    PAC (premature atrial contraction)     Palpitation     PVC (premature ventricular contraction)     VT (ventricular tachycardia)     s/p multiple ablations     Social History     Socioeconomic History    Marital status:      Spouse name: None    Number of children: None    Years of education: None    Highest education level: None   Social Needs    Financial resource strain: None    Food insecurity - worry: None    Food insecurity - inability: None    Transportation needs - medical: None    Transportation needs - non-medical: None   Occupational History    None   Tobacco Use    Smoking status: Never Smoker   Substance and Sexual Activity    Alcohol use: No    Drug use: No    Sexual activity: Yes     Partners: Male   Other Topics Concern    None   Social History Narrative    She has 3 y/o  "baby.  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  Has 7 y/o step daughter as well.     Past Surgical History:   Procedure Laterality Date    CARDIAC ELECTROPHYSIOLOGY MAPPING AND ABLATION  05/20/2008    ACH: 13 complete RF lesions > 20 seconds and partial success    CARDIAC ELECTROPHYSIOLOGY MAPPING AND ABLATION  08/25/2006    [facility]: RVOT VT, and had multiple lesions ablated    LOOP RECORDER IMPLANT  05/20/2008    ACH: Loop recorder implant    LOOP RECORDER REMOVAL  01/2012    [facility]: Loop recorder removal       Review of Systems    GENERAL: No fever, chills, fatigability, malaise  or weight loss.  CHEST: Denies dyspnea on exertion, cyanosis, wheezing, cough, sputum production   CARDIOVASCULAR: Denies chest pain, palpitations, diaphoresis,  or reduced exercise tolerance.  ABDOMEN: Appetite normal. Denies diarrhea, abdominal pain, nausea or vomiting.  PERIPHERAL VASCULAR: No edema, varicosities, or cyanosis.  NEUROLOGIC: no no syncope , no headache.  Occasional dizziness with position change and climbing stairs.  MUSCULOSKELETAL: Denies muscle weakness, joint pain  PSYCHOLOGICAL/BEHAVIORAL: Denies anxiety, severe stress, confusion  SKIN: no rashes, lesions  HEMATOLOGIC: Denies any abnormal bruising or bleeding, denies sickle cell trait or disease  ALLERGY/IMMUNOLOGIC: Denies any environmental allergies.     Objective:   BP (!) 110/53 (BP Location: Right arm, Patient Position: Lying, BP Method: Large (Automatic))   Pulse 67   Resp 20   Ht 5' 2.56" (1.589 m)   Wt 74.5 kg (164 lb 4 oz)   SpO2 98%   BMI 29.51 kg/m²     Physical Exam  GENERAL: Awake, well-developed well-nourished, no apparent distress  HEENT: mucous membranes moist and pink, normocephalic, no cranial or carotid bruits, sclera anicteric  CHEST: Good air movement, clear to auscultation bilaterally  CARDIOVASCULAR: Quiet precordium, regular rate and rhythm, single S1, split S2, normal P2, No S3 or S4, no rubs or gallops. No " clicks or rumbles. No cardiomegaly by palpation.  No functional organic murmur.  ABDOMEN: Soft, nontender nondistended, no hepatosplenomegaly, no aortic bruits  EXTREMITIES: Warm well perfused, 3+ brachial/femoral pulses, capillary refill <3 seconds, no clubbing, cyanosis, or edema  NEURO: Alert and oriented, cooperative with exam, face symmetric, moves all extremities well.    Tests:   Today's EKG interpretation by Dr. Conroy reveals:   Sinus Rhythm and CRBBB   Abnormal EKG  (Final report in electronic medical record)    Echocardiogram:   Pertinent findings from the Echo dated 12/13/2016 are:   There are 4 chambers with normally aligned great vessels.  Chamber sizes are qualitatively normal.  There is good LV function.  There are no shunts noted.  Physiological TR, PI, MR.  The right coronary artery and left coronary are patent by 2D.  RVSP 25 mm Hg  TAPSE WNL  LV tissue doppler WNL  LA Volumes WNL  Clinical Correlation Suggested  Follow Up Warranted  (Full report in electronic medical record)    Holter/Event results  Date of Procedure: 10/25/2017  PRE-TEST DATA   The diary was sparsely completed.  TEST DESCRIPTION   PREDOMINANT RHYTHM  1. Sinus rhythm with heart rates varying between 43 and 153 bpm with an average of 66 bpm.   2. Bundle branch block throughout  VENTRICULAR ARRHYTHMIAS  1. There were no PVCs. There was no ventricular ectopic activity.  SUPRA VENTRICULAR ARRHYTHMIAS  1. There were very rare PACs recorded totalling 2 and averaging less than 1 per hour.   2. There were no episodes of sustained supraventricular tachycardia.  SINUS NODE FUNCTION  1. There was no evidence of high grade SA radha block.   AV CONDUCTION  1. There was no evidence of high grade AV block.   DIARY  1. The diary was sparsely completed.   2. There were 2 episodes of heart fluttering reported. The corresponding rhythm strips revealed the following:             During event 1 (17:00 day 1) the rhythm was sinus rhythm at 66 bpm.              During event 2 (14:21 day 3) the rhythm was sinus rhythm at 63 bpm, with PACs.   3. There was 1 episode of heart fluttering/ skips beat reported. The corresponding rhythm strips revealed the following:             During event 1 (20:00 day 2) the rhythm was sinus rhythm at 85 bpm.   4. There was 1 episode of skips beat / hot flash reported. The corresponding rhythm strips revealed the following:             During event 1 (14:20 day 3) the rhythm was sinus rhythm at 73 bpm.   MISCELLANEOUS  1. This was a tape of adequate length (48 hrs).     Event monitor.   Date of Procedure: 10/30/2017  CONCLUSIONS   MCOT  10/30/17-11/29/17  Baseline intraventricular conduction delay noted throughout.  High  bpm, Low HR 50 bpm, Avg HR 67 bpm.  Symptoms of heart racing correlated with sinus rhythm or sinus rhythm with PVC. Symptom of skipped beat correlated with sinus rhythm.  Autotrigger episodes showed sinus tachy and sinus rhythm  No SVT, no VT, no Atrial flutter or fib noted.  No prolonged pauses noted.    Assessment:  1. VT (ventricular tachycardia)    2. Complete right bundle branch block    3. S/P radiofrequency ablation operation for arrhythmia    4. Palpitation    5. Family history of early CAD    6. Family history of heart attack    7. Less than 8 weeks gestation of pregnancy      Discussion/Plan:   Belle Cid is a 23 y.o. female with a history of PVCs in VT status post ablation attempt in the past.  She is currently controlled with sotalol without significant symptoms.  She is also currently pregnant.  We would like her to continue the sotalol as long as it is okay with her obstetrician.  We discussed possible side effects of beta-blocker use while pregnant and encourage regular OB visits with fetal growth measurements.  We encouraged her to call with any palpitations in would apply Holter monitor.  Plan to see her in 3 months unless needed sooner.  Plan to recheck her lipids after the pregnancy.    I have  reviewed our general guidelines related to cardiac issues with the family.  I instructed them in the event of an emergency to call 911 or go to the nearest emergency room.  They know to contact the PCP if problems arise or if they are in doubt.    Follow up with the primary care provider for the following issues: Nothing identified.    Activity:She can participate in normal age-appropriate activities. She should be allowed to set her own pace and rest if fatigued.    No endocarditis prophylaxis is recommended in this circumstance.     I spent over 30 minutes with the patient. Over 50% of the time was spent counseling the patient and family member.    Patient or family member was asked to call the office within 3 days of any testing for results.     Dr. Conroy reviewed history and physical exam. He then performed the physical exam. He discussed the findings with the patient's caregiver(s), and answered all questions. I have reviewed our general guidelines related to cardiac issues with the family. I instructed them in the event of an emergency to call 911 or go to the nearest emergency room. They know to contact the PCP if problems arise or if they are in doubt.    Medications:   Current Outpatient Medications   Medication Sig    OMEGA-3 FATTY ACIDS/FISH OIL (OMEGA 3 FISH OIL ORAL) Take 1 capsule by mouth once daily.    sotalol (BETAPACE) 80 MG tablet TAKE 1/2 TABLET BY MOUTH nightly     No current facility-administered medications for this visit.         Orders:   Orders Placed This Encounter   Procedures    EKG 12-lead     Follow-Up:     Return to clinic in 3 months with EKG or sooner if there are any concerns.       Sincerely,  Brooks Conroy MD    Note Contributing Authors:  MD Levon Montero FNP-C  This documentation was created using Bilibot voice recognition software. Content is subject to voice recognition errors.    02/27/2019    Attestation: Broosk Conroy MD    I have reviewed the records  and agree with the above. I have examined the patient and discussed the findings with the family in attendance. All questions were answered to their satisfaction. I agree with the plan and the follow up instructions.

## 2019-02-26 NOTE — PATIENT INSTRUCTIONS
Brooks Conroy MD  Pediatric Cardiology  [address] [place] [address]  [phone]    General Guidelines    Name: Belle Cid                   : 1995    Diagnosis:   1. VT (ventricular tachycardia)    2. Complete right bundle branch block    3. S/P radiofrequency ablation operation for arrhythmia    4. Palpitation    5. Family history of early CAD    6. Family history of heart attack    7. Less than 8 weeks gestation of pregnancy        PCP: Luciano Perez MD  PCP Phone Number: [phone]    · If you have an emergency or you think you have an emergency, go to the nearest emergency room!     · Breathing too fast, doesnt look right, consistently not eating well, your child needs to be checked. These are general indications that your child is not feeling well. This may be caused by anything, a stomach virus, an ear ache or heart disease, so please call Luciano Perez MD. If Luciano Perez MD thinks you need to be checked for your heart, they will let us know.     · If your child experiences a rapid or very slow heart rate and has the following symptoms, call Luciano Perez MD or go to the nearest emergency room.   · unexplained chest pain   · does not look right   · feels like they are going to pass out   · actually passes out for unexplained reasons   · weakness or fatigue   · shortness of breath  or breathing fast   · consistent poor feeding     · If your child experiences a rapid or very slow heart rate that lasts longer than 30 minutes call Luciano Perez MD or go to the nearest emergency room.     · If your child feels like they are going to pass out - have them sit down or lay down immediately. Raise the feet above the head (prop the feet on a chair or the wall) until the feeling passes. Slowly allow the child to sit, then stand. If the feeling returns, lay back down and start over.     It is very important that you notify Luciano Perez MD first. Luciano Perez MD  or the ER Physician can reach Dr. Brooks Conroy at the office or through [facility] at [phone] as needed.    Call our office ([phone]) one week after ALL tests for results.

## 2019-07-23 ENCOUNTER — TELEPHONE (OUTPATIENT)
Dept: PEDIATRIC CARDIOLOGY | Facility: CLINIC | Age: 24
End: 2019-07-23

## 2019-07-23 NOTE — TELEPHONE ENCOUNTER
Belle phoned and advised she was supposed to come back in 3 months from February d/t she was pregnant. Belle advised she had a miscarriage and needs to know when she needs to come back.

## 2019-07-23 NOTE — TELEPHONE ENCOUNTER
Reviewed chart with TDK. He advised f/u for 6 months from last visit.    Phoned Belle advised her Dr. Conroy recommended 6 month f/u. Belle requested Sep. appointment scheduled for 09/10/2019.

## 2020-04-01 ENCOUNTER — TELEPHONE (OUTPATIENT)
Dept: PEDIATRIC CARDIOLOGY | Facility: CLINIC | Age: 25
End: 2020-04-01

## 2020-04-01 DIAGNOSIS — Z98.890 S/P RADIOFREQUENCY ABLATION OPERATION FOR ARRHYTHMIA: ICD-10-CM

## 2020-04-01 DIAGNOSIS — Z86.79 S/P RADIOFREQUENCY ABLATION OPERATION FOR ARRHYTHMIA: ICD-10-CM

## 2020-04-01 DIAGNOSIS — Z82.49 FAMILY HISTORY OF EARLY CAD: ICD-10-CM

## 2020-04-01 DIAGNOSIS — I47.20 VT (VENTRICULAR TACHYCARDIA): Primary | ICD-10-CM

## 2020-04-01 RX ORDER — SOTALOL HYDROCHLORIDE 80 MG/1
TABLET ORAL
Qty: 45 TABLET | Refills: 0 | Status: SHIPPED | OUTPATIENT
Start: 2020-04-01 | End: 2021-05-26

## 2020-04-01 NOTE — TELEPHONE ENCOUNTER
Spoke with pt by phone. Had miscarriage after last visit.  She is wanting to get caught up on labs and needs a new prescription for sotalol.  Late for follow up but without symptoms currently other than occasional woozy dizziness standing. Encouraged good fluid intake. Will delay visit for COVID concerns. Faxed order for lipids and faxed her a rx for Sotolol which will be written by someone at her facility so it can be filled there. Will place recall for 3 months with EKG.

## 2020-04-01 NOTE — TELEPHONE ENCOUNTER
"----- Message from DALILA Powell,PNP-C sent at 3/31/2020  9:47 AM CDT -----  Belle contacted me on Friday saying that she had to miss or reschedule her last visit due to her father's death, hasn't been seen in at least 18 months, and needs to get orders for her blood work, echo, etc which could be done at her hospital and sent to us. She hasn't "had a checkup in a long time and I just want to make sure everything is still good". I informed her that I'd have someone take a look at her chart and get back to her about any tests, orders, and appointments.   "

## 2020-09-08 ENCOUNTER — TELEPHONE (OUTPATIENT)
Dept: PEDIATRIC CARDIOLOGY | Facility: CLINIC | Age: 25
End: 2020-09-08

## 2020-09-08 NOTE — TELEPHONE ENCOUNTER
"Belle contacted me last night:    "I hate to bother you...but something isn't right. I'm having orthostatic hypotension severe. I've blacked out twice this weekend when trying to stand up. Just standing still feels weird and kind of foggy"    I reviewed her medications - Sotalol, Lizness, Focalin. No recent illnesses or stressors. Symptoms for about one week but worse this weekend. No skipped meals, excessive outside work, not on menstrual cycle. I suggested OH protocol - increase sodium containing fluids, no skipped meals, salty snacks, enough sleep. I advised that if she wasn't feeling any better, she should get BP checked at her office. We discussed the best fluids to drink and I encouraged Gatorade or Powerade. She said that she'd ordered something called Trace Mineral Drops but I noted that there were only 5mg of sodium in a serving, as compared to 150-250mg in one serving of Powerade/Gatorade. She asked how she'd know about blood loss and I advised she could get H/H done but would typically see in her stool or a wound or very heavy anemia. She reported being clammy and was laying down with feet elevated. She denied heart racing and reported HR 72bpm. I advised that if she was in doubt to go to ER or to PCP. "Usually fluids, rest, and salt make all the difference. Laying down with feet up helps too". I also reminded her to be sure she was not breathing rapidly and to slow breathing and relax. I told her that if it was her BP and OH, she should feel better laying down.     It has been about one year since her father passed away suddenly.     Today she sent me an update that she'd had 90oz of  Gatorade today and hadn't blacked out. She reported still not feeling her best but just weak and improving. I advised that she was past due for her follow-up here and that she should call to make an appointment if she wanted to continue being seen by Dr. Conroy. She will call tomorrow.  "

## 2020-09-15 ENCOUNTER — OFFICE VISIT (OUTPATIENT)
Dept: PEDIATRIC CARDIOLOGY | Facility: CLINIC | Age: 25
End: 2020-09-15
Payer: COMMERCIAL

## 2020-09-15 ENCOUNTER — CLINICAL SUPPORT (OUTPATIENT)
Dept: PEDIATRIC CARDIOLOGY | Facility: CLINIC | Age: 25
End: 2020-09-15
Attending: PHYSICIAN ASSISTANT
Payer: COMMERCIAL

## 2020-09-15 VITALS
RESPIRATION RATE: 18 BRPM | DIASTOLIC BLOOD PRESSURE: 60 MMHG | BODY MASS INDEX: 29.61 KG/M2 | HEIGHT: 63 IN | HEART RATE: 71 BPM | SYSTOLIC BLOOD PRESSURE: 112 MMHG | OXYGEN SATURATION: 97 % | WEIGHT: 167.13 LBS

## 2020-09-15 DIAGNOSIS — R42 DIZZINESS: ICD-10-CM

## 2020-09-15 DIAGNOSIS — Z98.890 S/P RADIOFREQUENCY ABLATION OPERATION FOR ARRHYTHMIA: ICD-10-CM

## 2020-09-15 DIAGNOSIS — I47.20 VT (VENTRICULAR TACHYCARDIA): ICD-10-CM

## 2020-09-15 DIAGNOSIS — I49.3 PVC (PREMATURE VENTRICULAR CONTRACTION): ICD-10-CM

## 2020-09-15 DIAGNOSIS — R55 SYNCOPE, UNSPECIFIED SYNCOPE TYPE: ICD-10-CM

## 2020-09-15 DIAGNOSIS — I45.10 COMPLETE RIGHT BUNDLE BRANCH BLOCK: ICD-10-CM

## 2020-09-15 DIAGNOSIS — R00.2 PALPITATION: Primary | ICD-10-CM

## 2020-09-15 DIAGNOSIS — Z82.49 FAMILY HISTORY OF HEART ATTACK: ICD-10-CM

## 2020-09-15 DIAGNOSIS — Z86.79 S/P RADIOFREQUENCY ABLATION OPERATION FOR ARRHYTHMIA: ICD-10-CM

## 2020-09-15 DIAGNOSIS — R53.83 FATIGUE, UNSPECIFIED TYPE: ICD-10-CM

## 2020-09-15 DIAGNOSIS — Z82.49 FAMILY HISTORY OF EARLY CAD: ICD-10-CM

## 2020-09-15 DIAGNOSIS — I49.1 PAC (PREMATURE ATRIAL CONTRACTION): ICD-10-CM

## 2020-09-15 PROBLEM — Z3A.01 LESS THAN 8 WEEKS GESTATION OF PREGNANCY: Status: RESOLVED | Noted: 2019-02-26 | Resolved: 2020-09-15

## 2020-09-15 PROCEDURE — 93000 ELECTROCARDIOGRAM COMPLETE: CPT | Mod: S$GLB,,, | Performed by: PEDIATRICS

## 2020-09-15 PROCEDURE — 99215 PR OFFICE/OUTPT VISIT, EST, LEVL V, 40-54 MIN: ICD-10-PCS | Mod: 25,S$GLB,, | Performed by: PHYSICIAN ASSISTANT

## 2020-09-15 PROCEDURE — 99215 OFFICE O/P EST HI 40 MIN: CPT | Mod: 25,S$GLB,, | Performed by: PHYSICIAN ASSISTANT

## 2020-09-15 PROCEDURE — 93000 PR ELECTROCARDIOGRAM, COMPLETE: ICD-10-PCS | Mod: S$GLB,,, | Performed by: PEDIATRICS

## 2020-09-15 RX ORDER — DEXMETHYLPHENIDATE HYDROCHLORIDE 20 MG/1
20 CAPSULE, EXTENDED RELEASE ORAL DAILY
COMMUNITY
End: 2021-05-18

## 2020-09-15 NOTE — PROGRESS NOTES
"[facility]  Belle Roldan  1995      Belle Roldan is a 25 y.o. female presenting for follow-up of    VT (ventricular tachycardia)    Complete right bundle branch block    PVC (premature ventricular contraction)    PAC (premature atrial contraction)    S/P radiofrequency ablation operation for arrhythmia    Palpitation    Family history of early CAD    Family history of heart attack       Belle is here today unaccompanied.    HPI  Belle was noted to have frequent cardiac ectopy during physical exam for sinus surgery in May 2006. An echo and holter were obtained. Holter revealed a lot of premature beats (junctional vs PVCs) and one suspect 3 beat VT run. She was hospitalized and monitored, had PVCs > 800 per hour, and transferred to [facility]. She had EP study by Dr. Kellogg 8/22/06, diagnosed with RVOT VT, and had multiple lesions ablated with no PVCs noted post-ablation. She was hospitalized again May 2007 with 3 beat VT run on holter. May 2008, she experienced syncope with possible VT episode. She was transferred to [facility] for evaluation and EP study, which was performed 5/20/08 with 13 complete RF lesions > 20 seconds and partial success. Implantable loop recorder was implanted at that time.   Shortly after, she was admitted to [facility] with swelling of her right leg and evidence of an active clot in her thigh. She was anticoagulated with heparin and coumadin. 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 Puentes.     She saw Dr. Currie on 11/26/17. Impression was " h/o PVC's and VT s/p ablation attempt in past.  She has been controlled on Sotalol without significant symptoms." The plan was Continue sotalol 40 mg qhs, could increase to 80 if starts having more palpitations again, will revisit concept of redo ablation attempt at some " point in future, f/u with EP in 6 months.      She was last seen 2/26/19. She was 5 weeks pregnant at the time but later had a miscarriage. No murmur noted. Planned for 3 month follow up but later changed to 6 months after miscarriage.     She has ADHD on Focalin.     Belle has been doing well until a few weeks ago when she developed dizziness with positional changes which worsened over the last week. She had two episodes of syncope last weekend. Since that time, she has increased her Gatorade intake. No further episodes of syncope but she is still having dizziness with standing. Her first episode of syncope occurred after standing up after sitting on the couch. She felt dizzy, saw black spots and had an episode of syncope that lasted 5 seconds. The second episode occurred after she was eating supper and stood up felt dizzy, saw black spots and had an episode of syncope that lasted 5 seconds. She has a history of poor fluid intake recently. She reports fatigue over the last 2 weeks. No history of recent illness. She is been under a lot of stress due to miscarriage, her father passing away, and her  working out of town over the past year but reports she is doing better.  She is seeing a mental health provider. She was started on Buspar but did not tolerate the medication. She reports unchanged palpations described as occasional skipped beats lasted 1 second. This occurs 1-2 times a month.     Denies any recent illness, surgeries, or hospitalizations.    There are no reports of chest pain, chest pain with exertion, cyanosis, dyspnea and tachypnea. No other cardiovascular or medical concerns are reported.     Current Medications:   Current Outpatient Medications   Medication Sig    dexmethylphenidate (FOCALIN XR) 20 MG 24 hr capsule Take 20 mg by mouth once daily.    linaCLOtide (LINZESS) 145 mcg Cap capsule Take 145 mcg by mouth before breakfast.    sotaloL (BETAPACE) 80 MG tablet 1/2 tab po q hs     OMEGA-3 FATTY ACIDS/FISH OIL (OMEGA 3 FISH OIL ORAL) Take 1 capsule by mouth once daily.     No current facility-administered medications for this visit.      Allergies:   Review of patient's allergies indicates:   Allergen Reactions    Amoxicillin-pot clavulanate     Iodine     Sulfamethoxazole-trimethoprim     Dye        Family History   Problem Relation Age of Onset    Fibromyalgia Mother     Meniere's disease Mother     Hypertension Father     Stroke Father 50    Hypertension Maternal Grandmother     Heart attacks under age 50 Maternal Grandfather     Coronary artery disease Maternal Grandfather         bypass    Diabetes Maternal Grandfather     Hypertension Maternal Grandfather     Arrhythmia Maternal Aunt         PVCs    Arrhythmia Other         PVCs    No Known Problems Child     Cardiomyopathy Neg Hx     Congenital heart disease Neg Hx     Early death Neg Hx     Long QT syndrome Neg Hx     Pacemaker/defibrilator Neg Hx      Past Medical History:   Diagnosis Date    Blood clot in vein     history of right femoral vein clot (2008)    Complete right bundle branch block     Dysautonomia     Family history of coronary artery disease     Maternal Grandfather- s/p bypass    Miscarriage 03/2019    Palpitation     VT (ventricular tachycardia)     s/p multiple ablations     Social History     Social History Narrative    She has 6 y/o baby-Glynn.  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  Has 7 y/o step daughter as well.      Past Surgical History:   Procedure Laterality Date    CARDIAC ELECTROPHYSIOLOGY MAPPING AND ABLATION  05/20/2008    ACH: 13 complete RF lesions > 20 seconds and partial success    CARDIAC ELECTROPHYSIOLOGY MAPPING AND ABLATION  08/25/2006    [facility]: RVOT VT, and had multiple lesions ablated    DILATION AND CURETTAGE OF UTERUS      LOOP RECORDER IMPLANT  05/20/2008    ACH: Loop recorder implant    LOOP RECORDER REMOVAL  01/2012    [facility]: Loop  "recorder removal     No birth history on file.    Past medical history, family history, surgical history, social history updated and reviewed today.     Review of Systems    GENERAL:+ fatigue,  No fever, chills, malaise, or weight loss.  CHEST: Denies PARKS, cyanosis, wheezing, cough, sputum production, or SOB.  CARDIOVASCULAR: + palpations, Denies chest pain,diaphoresis, SOB, or reduced exercise tolerance.  Endocrine: Denies polyphagia, polydipsia, or polyuria  Skin: Denies rashes or color change  HENT: Negative for congestion, headaches and sore throat.   ABDOMEN: Appetite fine. No weight loss. Denies diarrhea, abdominal pain, nausea, or vomiting.  PERIPHERAL VASCULAR: No edema, varicosities, or cyanosis.  Musculoskeletal: Negative for muscle weakness and stiffness.  NEUROLOGIC: +dizziness, + history of syncope by report, no headache   Psychiatric/Behavioral: Negative for altered mental status. The patient is not nervous/anxious.   Allergic/Immunologic: Negative for environmental allergies.     Objective:   /60 (BP Location: Right arm, Patient Position: Sitting, BP Method: Large (Manual))   Pulse 71   Resp 18   Ht 5' 2.91" (1.598 m)   Wt 75.8 kg (167 lb 1.7 oz)   SpO2 97%   BMI 29.68 kg/m²     Physical Exam  GENERAL: Awake, well-developed well-nourished, no apparent distress  HEENT: mucous membranes moist and pink, normocephalic, no cranial or carotid bruits, sclera anicteric  NECK:  no lymphadenopathy  CHEST: Good air movement, clear to auscultation bilaterally  CARDIOVASCULAR: Quiet precordium, regular rate and rhythm, single S1,  split S2, normal P2, No S3 or S4, no rubs or gallops. No clicks or rumbles. No cardiomegaly by palpation. No murmur noted. HR 68 bpm supine, 72 bpm sitting, and 96  bpm standing.  ABDOMEN: Soft, nontender nondistended, no hepatosplenomegaly, no aortic bruits  EXTREMITIES: Warm well perfused, 2+ radial/pedal/femoral pulses, capillary refill 2 seconds, no clubbing, cyanosis, or " edema  NEURO: Alert and oriented, cooperative with exam, face symmetric, moves all extremities well.  Skin: pink, turgor WNL  Vitals reviewed     Tests:   Today's EKG interpretation by Dr. Conroy reveals:   NSR   CRBBB  abnormal  (Final report in electronic medical record)    Echocardiogram:   Pertinent findings from the Echo dated 12/13/2016 are:   There are 4 chambers with normally aligned great vessels.  Chamber sizes are qualitatively normal.  There is good LV function.  There are no shunts noted.  Physiological TR, PI, MR.  The right coronary artery and left coronary are patent by 2D.  RVSP 25 mm Hg  TAPSE WNL  LV tissue doppler WNL  LA Volumes WNL  Clinical Correlation Suggested  Follow Up Warranted  (Full report in electronic medical record)     Holter/Event results  Date of Procedure: 10/25/2017  PRE-TEST DATA   The diary was sparsely completed.  TEST DESCRIPTION   PREDOMINANT RHYTHM  1. Sinus rhythm with heart rates varying between 43 and 153 bpm with an average of 66 bpm.   2. Bundle branch block throughout  VENTRICULAR ARRHYTHMIAS  1. There were no PVCs. There was no ventricular ectopic activity.  SUPRA VENTRICULAR ARRHYTHMIAS  1. There were very rare PACs recorded totalling 2 and averaging less than 1 per hour.   2. There were no episodes of sustained supraventricular tachycardia.  SINUS NODE FUNCTION  1. There was no evidence of high grade SA radha block.   AV CONDUCTION  1. There was no evidence of high grade AV block.   DIARY  1. The diary was sparsely completed.   2. There were 2 episodes of heart fluttering reported. The corresponding rhythm strips revealed the following:             During event 1 (17:00 day 1) the rhythm was sinus rhythm at 66 bpm.             During event 2 (14:21 day 3) the rhythm was sinus rhythm at 63 bpm, with PACs.   3. There was 1 episode of heart fluttering/ skips beat reported. The corresponding rhythm strips revealed the following:             During event 1 (20:00 day 2)  the rhythm was sinus rhythm at 85 bpm.   4. There was 1 episode of skips beat / hot flash reported. The corresponding rhythm strips revealed the following:             During event 1 (14:20 day 3) the rhythm was sinus rhythm at 73 bpm.   MISCELLANEOUS  1. This was a tape of adequate length (48 hrs).      Event monitor.   Date of Procedure: 10/30/2017  CONCLUSIONS   MCOT  10/30/17-11/29/17  Baseline intraventricular conduction delay noted throughout.  High  bpm, Low HR 50 bpm, Avg HR 67 bpm.  Symptoms of heart racing correlated with sinus rhythm or sinus rhythm with PVC. Symptom of skipped beat correlated with sinus rhythm.  Autotrigger episodes showed sinus tachy and sinus rhythm  No SVT, no VT, no Atrial flutter or fib noted.  No prolonged pauses noted.    Assessment:  Patient Active Problem List   Diagnosis    VT (ventricular tachycardia)    Complete right bundle branch block    PVC (premature ventricular contraction)    PAC (premature atrial contraction)    S/P radiofrequency ablation operation for arrhythmia    Palpitation    Family history of early CAD    Family history of heart attack    Syncope    Dizziness    Fatigue       Discussion/ Plan:   Dr. Conroy reviewed history and physical exam. He then performed the physical exam. He discussed the findings with the patient's caregiver(s), and answered all questions    Dr. Conroy and I have reviewed our general guidelines related to cardiac issues with the family.  I instructed them in the event of an emergency to call 911 or go to the nearest emergency room.  They know to contact the PCP if problems arise or if they are in doubt.    Jossy is followed for PVC's and VT s/p ablation attempt in past.  She has been controlled on Sotalol and should continue her current dose. Dr. Conroy discussed importance of following up with Dr. Serrato in the near future.  No PVCs noted on exam or EKG today.  Her EKG is stable with CRBBB.  She does report occasional  palpations. She will have a 7 day holter for evaluation. Dr. Conroy and I have discussed normal heart rate and rhythm, physiological tachycardia, and cardiac dysrhythmias. We have discussed red flags for dysrhythmias including sudden onset and sudden resolution, heart rates which wake the child up from sleep during the night, tachycardia associated with syncope or which lasts for a long time, and heart rates which are very high. If Belle Roldan should have tachycardia(fast heart rate) lasting more than 20 min accompanied by symptoms (Chest pain, dizziness, shortness of breath), the parents or legal guardians should be notified. In the event that Belle has loss of consciousness or is unresponsive, you should call 911, initiate CPR and notify parents or legal guardian.I have given the caregiver a handout with heart rate norms for her age and instructed them in how to count a heart rate using radial pulse. I have asked the caregiver to cut out her caffeine and to keep a diary of any tachycardia symptoms including activity, caffeine consumption, and heart rate. If her tachycardia persists, the family should call so that we can consider further evaluation with event recorder.     We do not think Belle's fatigue is cardiac related. However, Dr. Conroy would like to do an echo to evaluate her cardiac function.She should call for results. Should alert us if her fatigue does not improve.     Belle has a family history of early CAD/MI. She will have a fasting lipid panel. We will continue to monitor. Healthy lifestyle is encouraged. She should call for results.     Her symptoms of dizziness, syncope, and fatigue are most likely due to dysautonomia. We have discussed dysautonomia at length today. Dysautonomia is a term used to describe a multitude of symptoms that can occur with dysfunction of the autonomic nervous system. The autonomic nervous system serves as the main communication link between the brain and the organs  without conscious effort. There are different types of dysautonomia including postural orthostatic tachycardia syndrome (POTS), orthostatic hypotension (OH), and myalgic encephalomyelitis (ME) which is also known as chronic fatigue syndrome (CFS). Dysautonomia causes many symptoms that vary from person to person and can range in severity.  Common symptoms include: severe dizziness and fainting, headaches, severe fatigue, difficulty with concentration, heat or cold intolerance, palpitations, chest pain, weakness, venous pooling, nausea, vomiting, abdominal discomfort, and joint/muscle pain.  In part, these symptoms can be managed with a combination of non-pharmacologic interventions, including ensuring adequate fluid and salt intake, not skipping meals, limiting caffeine, self-limiting activities, and medications. There is nothing magic that we can do to make all of the symptoms go away.  The hope is to reduce symptoms so that important things such as the activities of daily living and education may be easier. It is important to know that symptoms may vary from hour to hour, day to day, throughout the month (especially for females), and throughout the year. Symptoms may abruptly start and are sometimes triggered by illnesses such as mono or flu.  Different people can have different combinations of symptoms. It is important to keep a daily log including fluid intake and symptoms. Protocol and guidelines were reviewed and include no dark water swimming without a life vest, no clear water swimming without a life vest and/or strict  and/or adult supervision.  If syncope or presyncope is experienced, they are to resume a position of comfort, either sitting or laying down.  I also suggested they elevate their feet 6 inches above their head.     Dysautonomia symptoms can be controlled by using a combination of medications and nonpharmacologic treatments which include:  · Drink 80+ ounces of fluid (tap water,  Propel, Gatorade, G2, Powerade, Powerade zero, Splash) each day, and have a salty snack (pretzels, saltines, pickles).    · Dont skip meals. Recommend eating 5-6 small meals a day.  · Avoid large meals that contain a lot of carbohydrates which may exacerbate your symptoms.   · No caffeine (its a diuretic, so it makes you urinate and empty your tank of fluid)  · Raise the head of your bed 4-6 inches on something firm to help reduce dizziness in the morning when you get up  · Insomnia can be treated with good sleep habits:  o Lower the lights one hour before bedtime  o Do a relaxing activity, such as reading under low light, massage, meditation, yoga, stretching, or a warm bath.    o Turn off the television, computer and video games, and stop cell phone use.  o When it is time for bed, the room should be dark (no night lights) and cool, but not cold.  · Avoid triggers that worsen symptoms:  o Have a consistent bedtime and amount of sleep (10-14 hours for adolescents).  o Avoid extreme heat or cold.  o Avoid stressful situations if possible  · Wear compression stockings (30-40 mmHg) which should extend from waist to toe. They should be worn during awake hours.  · A cooling vest is a vest with gel inserts that can be cooled in the freezer, then inserted into the vest and worn when it is hot outside.  There are also evaporative cooling vests, as well.  Patients who cannot tolerate the heat often appreciate these.    ·  Coping with a chronic disease is stressful.  Many families find it helpful to see a mental health provider.     Participating in exercise is critical to the successful management of dysautonomia, and to the long-term improvement and resolution of symptoms.  Start with a small amount of leg and core strengthening exercise, such as 5 minutes/day, and increasing by 5 minutes/day every week up to 30 to 60 minutes/day. Despite possible initial worsening of symptoms and decreased overall energy, we  recommend to try to push through as best as possible.  If needed, you may take a two-day break, and then resume exercise at a lower duration and/or intensity, and work back up to following the protocol. Again, this is a vital part of the program and is important for you to follow.  Failure to exercise regularly makes it difficult for us to help you manage your  symptoms and may contribute to ongoing problems and quality of life.     I spent over  40 min attending to the patient. This includes time spent performing a complete history, physical exam,  ROS, review of current medications, explanation of labs and testing, and referral to subspecialists if necessary. More than 50% of my time was spent on educating/counseling the patient and caregiver about the diagnosis, risks and treatment plan.       Activity:She can participate in normal age-appropriate activities. She should be allowed to set .his own pace and rest if fatigued.       No endocarditis prophylaxis is recommended in this circumstance.      Medications:   Current Outpatient Medications   Medication Sig    dexmethylphenidate (FOCALIN XR) 20 MG 24 hr capsule Take 20 mg by mouth once daily.    linaCLOtide (LINZESS) 145 mcg Cap capsule Take 145 mcg by mouth before breakfast.    sotaloL (BETAPACE) 80 MG tablet 1/2 tab po q hs    OMEGA-3 FATTY ACIDS/FISH OIL (OMEGA 3 FISH OIL ORAL) Take 1 capsule by mouth once daily.     No current facility-administered medications for this visit.          Orders placed this encounter  Orders Placed This Encounter   Procedures    Lipid Panel    Holter Monitor - 3-14 Day Pediatrics    EKG 12-lead    Echocardiogram pediatric         Follow-Up:     Return to clinic in March 2021 with EKG pending echo, 7 day holter,lipids,  and visit with Dr. Serrato or sooner if there are any concerns      Sincerely,  Brooks Conroy MD    Note Contributing Authors:  MD Harper Montero PA-C  09/15/2020    Attestation: Brooks RUIZ  MD Rafiq    I have reviewed the records and agree with the above. I have examined the patient and discussed the findings with the family in attendance. All questions were answered to their satisfaction. I agree with the plan and the follow up instructions.

## 2020-09-15 NOTE — LETTER
September 15, 2020      Alyson Do, [place]  [facility] [id number] [place] [address] Cardiology  [address]  [facility] [address]  Phone: [phone]  Fax: [phone]          Patient: eBlle Roldan   MR Number: [id number]   YOB: 1995   Date of Visit: 9/15/2020       Dear Alyson Do:    Thank you for referring Belle Roldan to me for evaluation. Attached you will find relevant portions of my assessment and plan of care.    If you have questions, please do not hesitate to call me. I look forward to following Belle Roldan along with you.    Sincerely,    Harper Martinez PA-C    Enclosure  CC:  No Recipients    If you would like to receive this communication electronically, please contact [email or web address] or [phone] to request more information on [employer] Link access.    For providers and/or their staff who would like to refer a patient to [facility], please contact us through our one-stop-shop provider referral line, [facility], at [phone].    If you feel you have received this communication in error or would no longer like to receive these types of communications, please e-mail [email or web address]

## 2020-09-15 NOTE — PATIENT INSTRUCTIONS
Brooks Conroy MD  Pediatric Cardiology  [address] [place] [address]  [phone]    General Guidelines    Name: Belle Roldan                   : 1995    Diagnosis:   1. VT (ventricular tachycardia)    2. Complete right bundle branch block    3. PVC (premature ventricular contraction)    4. S/P radiofrequency ablation operation for arrhythmia        PCP: Alyson Do NP  PCP Phone Number: [phone]    · If you have an emergency or you think you have an emergency, go to the nearest emergency room!     · Breathing too fast, doesnt look right, consistently not eating well, your child needs to be checked. These are general indications that your child is not feeling well. This may be caused by anything, a stomach virus, an ear ache or heart disease, so please call Alyson Do NP. If Alyson Do NP thinks you need to be checked for your heart, they will let us know.     · If your child experiences a rapid or very slow heart rate and has the following symptoms, call Alyson Do NP or go to the nearest emergency room.   · unexplained chest pain   · does not look right   · feels like they are going to pass out   · actually passes out for unexplained reasons   · weakness or fatigue   · shortness of breath  or breathing fast   · consistent poor feeding     · If your child experiences a rapid or very slow heart rate that lasts longer than 30 minutes call Alyson Do NP or go to the nearest emergency room.     · If your child feels like they are going to pass out - have them sit down or lay down immediately. Raise the feet above the head (prop the feet on a chair or the wall) until the feeling passes. Slowly allow the child to sit, then stand. If the feeling returns, lay back down and start over.     It is very important that you notify Alyson Do NP first. Alyson Do NP or the ER Physician can reach Dr. Brooks Conroy at the office or through [facility] at [phone] as needed.    Call our office ([phone]) one week after ALL tests for results.

## 2020-09-18 ENCOUNTER — TELEPHONE (OUTPATIENT)
Dept: PEDIATRIC CARDIOLOGY | Facility: CLINIC | Age: 25
End: 2020-09-18

## 2020-09-18 NOTE — TELEPHONE ENCOUNTER
Left message with call back number to schedule telemed visit with Dr. Serrato. Scheduled Nov 16th @ 10 am and will mail appt slip

## 2020-09-29 ENCOUNTER — TELEPHONE (OUTPATIENT)
Dept: PEDIATRIC CARDIOLOGY | Facility: CLINIC | Age: 25
End: 2020-09-29

## 2020-09-29 NOTE — TELEPHONE ENCOUNTER
I spoke to Belle on 9/29/2020. She states she was prescribed Zoloft for anxiety but Ms. Cuevas, psych NP, at [facility]. She states after the second dose she started having panic attacks which felt like chest pressure, tongue tingling, stomach burning, and sweating. She has been evaluated in the ER twice for her panic attacks- records reviewed today and WNL(cardiac enzymes and EKG WNL). She states since thin she continues to have panic attacks off the Zoloft. She states her panic attacks last a few minutes to one hour and resolve after laying down.  She states she was previously on Buspar for anxiety and had a similar reaction like Zoloft but not as severe. She does not want to take any medications for her panic attacks. She has follow up with Ms. Cuevas this afternoon. I discussed that I will review with Dr. Conroy and call her back.     Reviewed with Dr. Conroy and cannot explain her symptoms from a cardiac standpoint. He recommended follow up with a mental health provider such as Dr. Nicolás caldwell. Spoke to Belle again and reviewed above. Offered referral to Dr. Nicolás Caldwell but she declined.  She did state is already feeling better this afternoon after going on a walk.     ----- Message from Ruma Conroy RN sent at 9/29/2020  9:54 AM CDT -----  Phoned Belle. Belle advised she spoke with her PCP and they started Zoloft per Harper's recommendations 25mg bid last Thursday. Belle advised on Thursday she had diarrhea and nausea after taking medication. Spoke with PCP and they advised if she could handle taking to continue d/t Zoloft was not completley in her system. 2nd day Friday am and Friday pm- Belle reports she had severe reaction to Zoloft and ended up at [place] [facility]. Belle reports the reaction was worse than anything she has ever experienced with her heart diagnosis. She thought she was going to die. She has stopped Zoloft but is now having panic attacks (which she did not have prior).  Panic attacks are occurring daily and multiple times a day. Belle advised they come on suddenly and she never knows when they will occur. Belle is scared to take her Focalin since the reaction. Belle reports she is taking her cardiac medications as prescribed. Belle is requesting to speak with you further about experience. Requested records from [facility].  ----- Message -----  From: Harper Martinez PA-C  Sent: 9/29/2020   9:46 AM CDT  To: [facility] Staff    Please call and get more info.    Harper Ly  ----- Message -----  From: Tanja Pennington MA  Sent: 9/29/2020   9:12 AM CDT  To: Harper Martinez PA-C    Please give her a call at [phone], needs to talk to you about her take Zoloft.

## 2020-10-02 LAB
OHS CV EVENT MONITOR DAY: 6
OHS CV HOLTER LENGTH DECIMAL HOURS: 166
OHS CV HOLTER LENGTH HOURS: 22
OHS CV HOLTER LENGTH MINUTES: 0

## 2020-10-05 ENCOUNTER — TELEPHONE (OUTPATIENT)
Dept: PEDIATRIC CARDIOLOGY | Facility: CLINIC | Age: 25
End: 2020-10-05

## 2020-10-05 DIAGNOSIS — I49.3 PVC (PREMATURE VENTRICULAR CONTRACTION): ICD-10-CM

## 2020-10-05 DIAGNOSIS — R00.1 BRADYCARDIA: Primary | ICD-10-CM

## 2020-10-05 DIAGNOSIS — I47.20 VENTRICULAR TACHYCARDIA: ICD-10-CM

## 2020-10-05 NOTE — TELEPHONE ENCOUNTER
Holter 9/15/20  Conclusion  Sinus rhythm  Rare PACs/PVCs  Sinus rhythm during diary symptoms   Diary  The diary was properly completed. The tape was adequate (6 days , 22 hours, 0 minutes).   There was an episode of no symptoms  reported. The corresponding rhythm strips revealed the following:        During the event (applied), the rhythm was sinus rhythm at 76 bpm.   There was an episode of no symptoms  reported. The corresponding rhythm strips revealed the following:        During the event (shopping), the rhythm was sinus rhythm at 87 bpm.   There was an episode of no symptoms  reported. The corresponding rhythm strips revealed the following:        During the event (driving), the rhythm was sinus rhythm at 108 bpm.   There was an episode of no symptoms  reported. The corresponding rhythm strips revealed the following:        During the event (mowing lawn), the rhythm was sinus rhythm at 102 bpm.   There was an episode of no symptoms  reported. The corresponding rhythm strips revealed the following:        During the event (cooking), the rhythm was sinus rhythm at 79 bpm.   There was an episode of fluttering/racing reported. The corresponding rhythm strips revealed the following:        During the event (lying in bed), the rhythm was sinus rhythm at 66 bpm.   There was an episode of fluttering/racing reported. The corresponding rhythm strips revealed the following:        During the event (lying in bed), the rhythm was sinus rhythm at sinus rhythm bpm.   There was an episode of no symptoms reported. The corresponding rhythm strips revealed the following:       During the event (woke up), the rhythm was sinus rhythm at 57 bpm.   There was an episode of no symptoms reported. The corresponding rhythm strips revealed the following:        During the event (driving), the rhythm was sinus rhythm at 90 bpm.   There was an episode of no symptoms reported. The corresponding rhythm strips revealed the following:         During the event (at work), the rhythm was sinus rhythm at 90 bpm.   Rhythm  Predominant Rhythm  Sinus rhythm with heart rates varying between 39 and 152 bpm with an average of 69 bpm.   Maximum heart rate recorded at: 15:58 on day 1.   Minimum heart rate recorded at 03:05 on day 8.   PVC  Ventricular Arrhythmias  There were very rare PVCs totalling 14 and averaging 0.08 per hour.   PAC  Supraventricular Arrhythmias  There were very rare PACs totalling 288 and averaging 1.73 per hour.     Dr. Conroy reviewed her holter with chart. No concerning dysthymias. Her minimum HR was 39 bpm. Dr. Conroy believes this is likely due to the Sotalol and can continue on the medication.  However, Dr. Conroy would like to do a stress test for further evaluation. Spoke to Belle on 10/5/2020. Discussed results and plan. She expressed understanding. Dysrhythmias precautions are to be followed and should alert us with any concerns.

## 2020-10-06 ENCOUNTER — CLINICAL SUPPORT (OUTPATIENT)
Dept: PEDIATRIC CARDIOLOGY | Facility: CLINIC | Age: 25
End: 2020-10-06
Attending: PHYSICIAN ASSISTANT
Payer: COMMERCIAL

## 2020-10-06 DIAGNOSIS — Z86.79 S/P RADIOFREQUENCY ABLATION OPERATION FOR ARRHYTHMIA: ICD-10-CM

## 2020-10-06 DIAGNOSIS — Z98.890 S/P RADIOFREQUENCY ABLATION OPERATION FOR ARRHYTHMIA: ICD-10-CM

## 2020-10-06 DIAGNOSIS — I47.20 VT (VENTRICULAR TACHYCARDIA): ICD-10-CM

## 2020-10-06 DIAGNOSIS — I45.10 COMPLETE RIGHT BUNDLE BRANCH BLOCK: ICD-10-CM

## 2020-10-06 DIAGNOSIS — I49.3 PVC (PREMATURE VENTRICULAR CONTRACTION): ICD-10-CM

## 2020-10-30 ENCOUNTER — CLINICAL SUPPORT (OUTPATIENT)
Dept: PEDIATRIC CARDIOLOGY | Facility: CLINIC | Age: 25
End: 2020-10-30
Attending: PHYSICIAN ASSISTANT
Payer: COMMERCIAL

## 2020-10-30 DIAGNOSIS — R00.1 BRADYCARDIA: ICD-10-CM

## 2020-10-30 DIAGNOSIS — I47.20 VENTRICULAR TACHYCARDIA: ICD-10-CM

## 2020-10-30 DIAGNOSIS — I49.3 PVC (PREMATURE VENTRICULAR CONTRACTION): ICD-10-CM

## 2020-11-02 ENCOUNTER — DOCUMENTATION ONLY (OUTPATIENT)
Dept: PEDIATRIC CARDIOLOGY | Facility: CLINIC | Age: 25
End: 2020-11-02

## 2020-11-02 NOTE — PROGRESS NOTES
Dr. Serrato appointment scheduled for 11/16 already.per Dr. Conroy, called and reminded her of her upcoming appointment with Dr. Serrato.

## 2020-11-10 DIAGNOSIS — Z98.890 S/P RADIOFREQUENCY ABLATION OPERATION FOR ARRHYTHMIA: ICD-10-CM

## 2020-11-10 DIAGNOSIS — I47.20 VT (VENTRICULAR TACHYCARDIA): Primary | ICD-10-CM

## 2020-11-10 DIAGNOSIS — I49.3 PVC (PREMATURE VENTRICULAR CONTRACTION): ICD-10-CM

## 2020-11-10 DIAGNOSIS — I49.1 PAC (PREMATURE ATRIAL CONTRACTION): ICD-10-CM

## 2020-11-10 DIAGNOSIS — Z86.79 S/P RADIOFREQUENCY ABLATION OPERATION FOR ARRHYTHMIA: ICD-10-CM

## 2021-04-06 ENCOUNTER — TELEPHONE (OUTPATIENT)
Dept: PEDIATRIC CARDIOLOGY | Facility: CLINIC | Age: 26
End: 2021-04-06

## 2021-04-06 DIAGNOSIS — I47.20 V TACH: Primary | ICD-10-CM

## 2021-04-06 DIAGNOSIS — I45.10 COMPLETE RIGHT BUNDLE BRANCH BLOCK (RBBB): ICD-10-CM

## 2021-04-08 ENCOUNTER — CLINICAL SUPPORT (OUTPATIENT)
Dept: PEDIATRIC CARDIOLOGY | Facility: CLINIC | Age: 26
End: 2021-04-08
Attending: PEDIATRICS
Payer: COMMERCIAL

## 2021-04-08 ENCOUNTER — OFFICE VISIT (OUTPATIENT)
Dept: PEDIATRIC CARDIOLOGY | Facility: CLINIC | Age: 26
End: 2021-04-08
Payer: COMMERCIAL

## 2021-04-08 VITALS
RESPIRATION RATE: 18 BRPM | HEART RATE: 59 BPM | BODY MASS INDEX: 29.8 KG/M2 | HEIGHT: 63 IN | WEIGHT: 168.19 LBS | DIASTOLIC BLOOD PRESSURE: 60 MMHG | OXYGEN SATURATION: 97 % | SYSTOLIC BLOOD PRESSURE: 110 MMHG

## 2021-04-08 DIAGNOSIS — I45.10 COMPLETE RIGHT BUNDLE BRANCH BLOCK: ICD-10-CM

## 2021-04-08 DIAGNOSIS — I49.1 PAC (PREMATURE ATRIAL CONTRACTION): ICD-10-CM

## 2021-04-08 DIAGNOSIS — R00.2 PALPITATION: ICD-10-CM

## 2021-04-08 DIAGNOSIS — Z86.79 S/P RADIOFREQUENCY ABLATION OPERATION FOR ARRHYTHMIA: ICD-10-CM

## 2021-04-08 DIAGNOSIS — I47.20 VT (VENTRICULAR TACHYCARDIA): ICD-10-CM

## 2021-04-08 DIAGNOSIS — Z82.49 FAMILY HISTORY OF EARLY CAD: ICD-10-CM

## 2021-04-08 DIAGNOSIS — Z98.890 S/P RADIOFREQUENCY ABLATION OPERATION FOR ARRHYTHMIA: ICD-10-CM

## 2021-04-08 DIAGNOSIS — I49.3 PVC (PREMATURE VENTRICULAR CONTRACTION): ICD-10-CM

## 2021-04-08 DIAGNOSIS — Z82.49 FAMILY HISTORY OF HEART ATTACK: ICD-10-CM

## 2021-04-08 DIAGNOSIS — I47.20 V TACH: Primary | ICD-10-CM

## 2021-04-08 PROCEDURE — 93000 ELECTROCARDIOGRAM COMPLETE: CPT | Mod: S$GLB,,, | Performed by: PEDIATRICS

## 2021-04-08 PROCEDURE — 99214 PR OFFICE/OUTPT VISIT, EST, LEVL IV, 30-39 MIN: ICD-10-PCS | Mod: 25,S$GLB,, | Performed by: NURSE PRACTITIONER

## 2021-04-08 PROCEDURE — 93000 EKG 12-LEAD: ICD-10-PCS | Mod: S$GLB,,, | Performed by: PEDIATRICS

## 2021-04-08 PROCEDURE — 99214 OFFICE O/P EST MOD 30 MIN: CPT | Mod: 25,S$GLB,, | Performed by: NURSE PRACTITIONER

## 2021-04-28 LAB
OHS CV EVENT MONITOR DAY: 7
OHS CV HOLTER LENGTH DECIMAL HOURS: 169
OHS CV HOLTER LENGTH HOURS: 1
OHS CV HOLTER LENGTH MINUTES: 0

## 2021-05-12 ENCOUNTER — PATIENT MESSAGE (OUTPATIENT)
Dept: RESEARCH | Facility: HOSPITAL | Age: 26
End: 2021-05-12

## 2021-05-18 ENCOUNTER — OFFICE VISIT (OUTPATIENT)
Dept: PEDIATRIC CARDIOLOGY | Facility: CLINIC | Age: 26
End: 2021-05-18
Payer: COMMERCIAL

## 2021-05-18 VITALS
DIASTOLIC BLOOD PRESSURE: 72 MMHG | RESPIRATION RATE: 20 BRPM | SYSTOLIC BLOOD PRESSURE: 120 MMHG | HEIGHT: 64 IN | WEIGHT: 170.5 LBS | BODY MASS INDEX: 29.11 KG/M2 | HEART RATE: 69 BPM | OXYGEN SATURATION: 99 %

## 2021-05-18 DIAGNOSIS — I47.20 VT (VENTRICULAR TACHYCARDIA): Primary | ICD-10-CM

## 2021-05-18 DIAGNOSIS — Z86.79 S/P RADIOFREQUENCY ABLATION OPERATION FOR ARRHYTHMIA: ICD-10-CM

## 2021-05-18 DIAGNOSIS — Z82.49 FAMILY HISTORY OF EARLY CAD: ICD-10-CM

## 2021-05-18 DIAGNOSIS — Z98.890 S/P RADIOFREQUENCY ABLATION OPERATION FOR ARRHYTHMIA: ICD-10-CM

## 2021-05-18 DIAGNOSIS — I45.10 COMPLETE RIGHT BUNDLE BRANCH BLOCK: ICD-10-CM

## 2021-05-18 DIAGNOSIS — Z82.49 FAMILY HISTORY OF HEART ATTACK: ICD-10-CM

## 2021-05-18 PROCEDURE — 99214 PR OFFICE/OUTPT VISIT, EST, LEVL IV, 30-39 MIN: ICD-10-PCS | Mod: 25,S$GLB,, | Performed by: NURSE PRACTITIONER

## 2021-05-18 PROCEDURE — 93000 ELECTROCARDIOGRAM COMPLETE: CPT | Mod: S$GLB,,, | Performed by: PEDIATRICS

## 2021-05-18 PROCEDURE — 99214 OFFICE O/P EST MOD 30 MIN: CPT | Mod: 25,S$GLB,, | Performed by: NURSE PRACTITIONER

## 2021-05-18 PROCEDURE — 93000 EKG 12-LEAD: ICD-10-PCS | Mod: S$GLB,,, | Performed by: PEDIATRICS

## 2021-06-28 ENCOUNTER — CLINICAL SUPPORT (OUTPATIENT)
Dept: PEDIATRIC CARDIOLOGY | Facility: CLINIC | Age: 26
End: 2021-06-28
Payer: COMMERCIAL

## 2021-06-28 ENCOUNTER — OFFICE VISIT (OUTPATIENT)
Dept: PEDIATRIC CARDIOLOGY | Facility: CLINIC | Age: 26
End: 2021-06-28
Payer: COMMERCIAL

## 2021-06-28 VITALS
DIASTOLIC BLOOD PRESSURE: 60 MMHG | HEIGHT: 63 IN | DIASTOLIC BLOOD PRESSURE: 60 MMHG | WEIGHT: 179.13 LBS | WEIGHT: 179.25 LBS | RESPIRATION RATE: 16 BRPM | HEART RATE: 56 BPM | HEART RATE: 56 BPM | BODY MASS INDEX: 31.74 KG/M2 | OXYGEN SATURATION: 97 % | OXYGEN SATURATION: 97 % | SYSTOLIC BLOOD PRESSURE: 122 MMHG | SYSTOLIC BLOOD PRESSURE: 122 MMHG | HEIGHT: 63 IN | BODY MASS INDEX: 31.76 KG/M2 | RESPIRATION RATE: 16 BRPM

## 2021-06-28 DIAGNOSIS — R55 VASOVAGAL SYNCOPE: ICD-10-CM

## 2021-06-28 DIAGNOSIS — Z98.890 S/P RADIOFREQUENCY ABLATION OPERATION FOR ARRHYTHMIA: ICD-10-CM

## 2021-06-28 DIAGNOSIS — I47.20 VT (VENTRICULAR TACHYCARDIA): ICD-10-CM

## 2021-06-28 DIAGNOSIS — Z98.890 S/P RADIOFREQUENCY ABLATION OPERATION FOR ARRHYTHMIA: Primary | ICD-10-CM

## 2021-06-28 DIAGNOSIS — I45.10 COMPLETE RIGHT BUNDLE BRANCH BLOCK: ICD-10-CM

## 2021-06-28 DIAGNOSIS — I49.1 PAC (PREMATURE ATRIAL CONTRACTION): ICD-10-CM

## 2021-06-28 DIAGNOSIS — Z86.79 S/P RADIOFREQUENCY ABLATION OPERATION FOR ARRHYTHMIA: ICD-10-CM

## 2021-06-28 DIAGNOSIS — Z86.79 S/P RADIOFREQUENCY ABLATION OPERATION FOR ARRHYTHMIA: Primary | ICD-10-CM

## 2021-06-28 DIAGNOSIS — I49.3 PVC (PREMATURE VENTRICULAR CONTRACTION): ICD-10-CM

## 2021-06-28 PROCEDURE — 1126F PR PAIN SEVERITY QUANTIFIED, NO PAIN PRESENT: ICD-10-PCS | Mod: GT,S$GLB,, | Performed by: PEDIATRICS

## 2021-06-28 PROCEDURE — 99214 OFFICE O/P EST MOD 30 MIN: CPT | Mod: 25,GT,S$GLB, | Performed by: PEDIATRICS

## 2021-06-28 PROCEDURE — 3008F BODY MASS INDEX DOCD: CPT | Mod: CPTII,GT,S$GLB, | Performed by: PEDIATRICS

## 2021-06-28 PROCEDURE — 3008F PR BODY MASS INDEX (BMI) DOCUMENTED: ICD-10-PCS | Mod: CPTII,GT,S$GLB, | Performed by: PEDIATRICS

## 2021-06-28 PROCEDURE — 99214 PR OFFICE/OUTPT VISIT, EST, LEVL IV, 30-39 MIN: ICD-10-PCS | Mod: 25,GT,S$GLB, | Performed by: PEDIATRICS

## 2021-06-28 PROCEDURE — 1126F AMNT PAIN NOTED NONE PRSNT: CPT | Mod: GT,S$GLB,, | Performed by: PEDIATRICS

## 2021-10-04 ENCOUNTER — OFFICE VISIT (OUTPATIENT)
Dept: PEDIATRIC CARDIOLOGY | Facility: CLINIC | Age: 26
End: 2021-10-04
Payer: COMMERCIAL

## 2021-10-04 VITALS
WEIGHT: 182.63 LBS | HEART RATE: 62 BPM | DIASTOLIC BLOOD PRESSURE: 60 MMHG | HEIGHT: 67 IN | RESPIRATION RATE: 18 BRPM | SYSTOLIC BLOOD PRESSURE: 116 MMHG | BODY MASS INDEX: 28.66 KG/M2 | OXYGEN SATURATION: 97 %

## 2021-10-04 DIAGNOSIS — Z86.79 S/P RADIOFREQUENCY ABLATION OPERATION FOR ARRHYTHMIA: ICD-10-CM

## 2021-10-04 DIAGNOSIS — Z20.822 EXPOSURE TO 2019 NOVEL CORONAVIRUS: ICD-10-CM

## 2021-10-04 DIAGNOSIS — I47.20 VT (VENTRICULAR TACHYCARDIA): ICD-10-CM

## 2021-10-04 DIAGNOSIS — G90.1 DYSAUTONOMIA: ICD-10-CM

## 2021-10-04 DIAGNOSIS — I45.10 COMPLETE RIGHT BUNDLE BRANCH BLOCK: ICD-10-CM

## 2021-10-04 DIAGNOSIS — Z98.890 S/P RADIOFREQUENCY ABLATION OPERATION FOR ARRHYTHMIA: ICD-10-CM

## 2021-10-04 DIAGNOSIS — Z82.49 FAMILY HISTORY OF EARLY CAD: ICD-10-CM

## 2021-10-04 PROCEDURE — 1159F PR MEDICATION LIST DOCUMENTED IN MEDICAL RECORD: ICD-10-PCS | Mod: CPTII,S$GLB,, | Performed by: NURSE PRACTITIONER

## 2021-10-04 PROCEDURE — 3074F SYST BP LT 130 MM HG: CPT | Mod: CPTII,S$GLB,, | Performed by: NURSE PRACTITIONER

## 2021-10-04 PROCEDURE — 93000 ELECTROCARDIOGRAM COMPLETE: CPT | Mod: S$GLB,,, | Performed by: PEDIATRICS

## 2021-10-04 PROCEDURE — 99214 PR OFFICE/OUTPT VISIT, EST, LEVL IV, 30-39 MIN: ICD-10-PCS | Mod: 25,S$GLB,, | Performed by: NURSE PRACTITIONER

## 2021-10-04 PROCEDURE — 3008F BODY MASS INDEX DOCD: CPT | Mod: CPTII,S$GLB,, | Performed by: NURSE PRACTITIONER

## 2021-10-04 PROCEDURE — 3078F PR MOST RECENT DIASTOLIC BLOOD PRESSURE < 80 MM HG: ICD-10-PCS | Mod: CPTII,S$GLB,, | Performed by: NURSE PRACTITIONER

## 2021-10-04 PROCEDURE — 3074F PR MOST RECENT SYSTOLIC BLOOD PRESSURE < 130 MM HG: ICD-10-PCS | Mod: CPTII,S$GLB,, | Performed by: NURSE PRACTITIONER

## 2021-10-04 PROCEDURE — 3008F PR BODY MASS INDEX (BMI) DOCUMENTED: ICD-10-PCS | Mod: CPTII,S$GLB,, | Performed by: NURSE PRACTITIONER

## 2021-10-04 PROCEDURE — 99214 OFFICE O/P EST MOD 30 MIN: CPT | Mod: 25,S$GLB,, | Performed by: NURSE PRACTITIONER

## 2021-10-04 PROCEDURE — 1159F MED LIST DOCD IN RCRD: CPT | Mod: CPTII,S$GLB,, | Performed by: NURSE PRACTITIONER

## 2021-10-04 PROCEDURE — 3078F DIAST BP <80 MM HG: CPT | Mod: CPTII,S$GLB,, | Performed by: NURSE PRACTITIONER

## 2021-10-04 PROCEDURE — 1160F PR REVIEW ALL MEDS BY PRESCRIBER/CLIN PHARMACIST DOCUMENTED: ICD-10-PCS | Mod: CPTII,S$GLB,, | Performed by: NURSE PRACTITIONER

## 2021-10-04 PROCEDURE — 1160F RVW MEDS BY RX/DR IN RCRD: CPT | Mod: CPTII,S$GLB,, | Performed by: NURSE PRACTITIONER

## 2021-10-04 PROCEDURE — 93000 EKG 12-LEAD: ICD-10-PCS | Mod: S$GLB,,, | Performed by: PEDIATRICS

## 2021-10-04 RX ORDER — DEXMETHYLPHENIDATE HYDROCHLORIDE 10 MG/1
10 TABLET ORAL 2 TIMES DAILY
COMMUNITY
Start: 2021-09-13

## 2021-10-12 ENCOUNTER — TELEPHONE (OUTPATIENT)
Dept: PEDIATRIC CARDIOLOGY | Facility: CLINIC | Age: 26
End: 2021-10-12

## 2021-10-21 ENCOUNTER — TELEPHONE (OUTPATIENT)
Dept: PEDIATRIC CARDIOLOGY | Facility: CLINIC | Age: 26
End: 2021-10-21

## 2022-01-11 ENCOUNTER — TELEPHONE (OUTPATIENT)
Dept: PEDIATRIC CARDIOLOGY | Facility: CLINIC | Age: 27
End: 2022-01-11
Payer: COMMERCIAL

## 2022-01-11 NOTE — TELEPHONE ENCOUNTER
----- Message from Ruma Conroy RN sent at 1/10/2022  1:41 PM CST -----    ----- Message -----  From: Gillian Pereira MA  Sent: 1/10/2022   1:36 PM CST  To: [facility] Staff    Patient was DX with Covid last week, was told by Dr. Conroy that if she got it to let him know. Having flutters about 1x daily since getting it and she has been more anxious. Anxiety is getting better but still fatigued and having flutters.       Patient- [phone]

## 2022-01-11 NOTE — TELEPHONE ENCOUNTER
Belle called back to let us know that her heart has a flutter lasting a second to a few seconds about once a day. Belle states that she was diagnosded with Covid on 1/4 and started symptoms on 1/2. Her initial symptoms consisted of fever, body aches . She did not have congestion. She started with some post nasal drip toward end of last week. She think the flutters started 1/5. She has not had any fever since the end of last week. She feels better today just wanted to mention that she still has the flutter once a day. She denies heart racing. We discussed that she may be having early beats from the top or bottom chamber and this may all be related to viral illness. We discussed that she could consider coming in to get and EKG or even a holter monitor today. She reported that she felt a little better and her anxiety improved after we talked. She would like to hold off on holter for now unless her symptoms worsen, increase in duration, or she conitnues to be concerned. She will call me toward the end of the week to update me on how she feels.

## 2022-01-13 ENCOUNTER — TELEPHONE (OUTPATIENT)
Dept: PEDIATRIC CARDIOLOGY | Facility: CLINIC | Age: 27
End: 2022-01-13
Payer: COMMERCIAL

## 2022-01-13 ENCOUNTER — PATIENT MESSAGE (OUTPATIENT)
Dept: PEDIATRIC CARDIOLOGY | Facility: CLINIC | Age: 27
End: 2022-01-13
Payer: COMMERCIAL

## 2022-01-13 NOTE — TELEPHONE ENCOUNTER
Called to talk to Belle because we received [facility] paperwork to return to work since having Covid. I explained to her that we cannot fill out that paperwork since we did not diagnose her. I asked if I could fax it somewhere and she said that she could just email it to that provider. Additionally, I asked how she was feeling and she stated she has not had any palpitations in 2 days. I told her we will see her in 8 weeks since she had recently had Covid to make sure that she is not having any further palpitations. I will forward to front staff to call her for the appointment since they were busy at the time. Belle is in agreement with the plan.

## 2022-01-20 ENCOUNTER — TELEPHONE (OUTPATIENT)
Dept: PEDIATRIC CARDIOLOGY | Facility: CLINIC | Age: 27
End: 2022-01-20
Payer: COMMERCIAL

## 2022-01-20 DIAGNOSIS — I49.3 PVC (PREMATURE VENTRICULAR CONTRACTION): ICD-10-CM

## 2022-01-20 DIAGNOSIS — I49.1 PAC (PREMATURE ATRIAL CONTRACTION): ICD-10-CM

## 2022-01-20 DIAGNOSIS — I47.20 VT (VENTRICULAR TACHYCARDIA): ICD-10-CM

## 2022-01-20 DIAGNOSIS — R00.2 PALPITATION: Primary | ICD-10-CM

## 2022-01-20 NOTE — TELEPHONE ENCOUNTER
----- Message from Kiara Yeung MA sent at 1/20/2022  9:08 AM CST -----  Regarding: Belle [phone]  She is asking for an holter they have 48 and 72 hour holters there.  Please put in an order and let me know so I can fax it over for her-

## 2022-01-21 DIAGNOSIS — R00.2 PALPITATIONS: ICD-10-CM

## 2022-01-21 DIAGNOSIS — I47.20 VENTRICULAR TACHYCARDIA: Primary | ICD-10-CM

## 2022-03-10 ENCOUNTER — OFFICE VISIT (OUTPATIENT)
Dept: PEDIATRIC CARDIOLOGY | Facility: CLINIC | Age: 27
End: 2022-03-10
Payer: COMMERCIAL

## 2022-03-10 VITALS
DIASTOLIC BLOOD PRESSURE: 64 MMHG | SYSTOLIC BLOOD PRESSURE: 118 MMHG | HEART RATE: 53 BPM | HEIGHT: 65 IN | BODY MASS INDEX: 30.27 KG/M2 | OXYGEN SATURATION: 97 % | RESPIRATION RATE: 20 BRPM | WEIGHT: 181.69 LBS

## 2022-03-10 DIAGNOSIS — Z86.79 S/P RADIOFREQUENCY ABLATION OPERATION FOR ARRHYTHMIA: ICD-10-CM

## 2022-03-10 DIAGNOSIS — Z82.49 FAMILY HISTORY OF EARLY CAD: ICD-10-CM

## 2022-03-10 DIAGNOSIS — Z82.49 FAMILY HISTORY OF HEART ATTACK: ICD-10-CM

## 2022-03-10 DIAGNOSIS — I47.20 VENTRICULAR TACHYCARDIA: ICD-10-CM

## 2022-03-10 DIAGNOSIS — Z98.890 S/P RADIOFREQUENCY ABLATION OPERATION FOR ARRHYTHMIA: ICD-10-CM

## 2022-03-10 DIAGNOSIS — I45.10 COMPLETE RIGHT BUNDLE BRANCH BLOCK: ICD-10-CM

## 2022-03-10 DIAGNOSIS — R00.2 PALPITATIONS: ICD-10-CM

## 2022-03-10 PROCEDURE — 3078F PR MOST RECENT DIASTOLIC BLOOD PRESSURE < 80 MM HG: ICD-10-PCS | Mod: CPTII,S$GLB,, | Performed by: NURSE PRACTITIONER

## 2022-03-10 PROCEDURE — 3008F BODY MASS INDEX DOCD: CPT | Mod: CPTII,S$GLB,, | Performed by: NURSE PRACTITIONER

## 2022-03-10 PROCEDURE — 93000 EKG 12-LEAD: ICD-10-PCS | Mod: S$GLB,,, | Performed by: PEDIATRICS

## 2022-03-10 PROCEDURE — 3078F DIAST BP <80 MM HG: CPT | Mod: CPTII,S$GLB,, | Performed by: NURSE PRACTITIONER

## 2022-03-10 PROCEDURE — 99214 PR OFFICE/OUTPT VISIT, EST, LEVL IV, 30-39 MIN: ICD-10-PCS | Mod: 25,S$GLB,, | Performed by: NURSE PRACTITIONER

## 2022-03-10 PROCEDURE — 99214 OFFICE O/P EST MOD 30 MIN: CPT | Mod: 25,S$GLB,, | Performed by: NURSE PRACTITIONER

## 2022-03-10 PROCEDURE — 1160F PR REVIEW ALL MEDS BY PRESCRIBER/CLIN PHARMACIST DOCUMENTED: ICD-10-PCS | Mod: CPTII,S$GLB,, | Performed by: NURSE PRACTITIONER

## 2022-03-10 PROCEDURE — 1160F RVW MEDS BY RX/DR IN RCRD: CPT | Mod: CPTII,S$GLB,, | Performed by: NURSE PRACTITIONER

## 2022-03-10 PROCEDURE — 1159F PR MEDICATION LIST DOCUMENTED IN MEDICAL RECORD: ICD-10-PCS | Mod: CPTII,S$GLB,, | Performed by: NURSE PRACTITIONER

## 2022-03-10 PROCEDURE — 1159F MED LIST DOCD IN RCRD: CPT | Mod: CPTII,S$GLB,, | Performed by: NURSE PRACTITIONER

## 2022-03-10 PROCEDURE — 3008F PR BODY MASS INDEX (BMI) DOCUMENTED: ICD-10-PCS | Mod: CPTII,S$GLB,, | Performed by: NURSE PRACTITIONER

## 2022-03-10 PROCEDURE — 3074F PR MOST RECENT SYSTOLIC BLOOD PRESSURE < 130 MM HG: ICD-10-PCS | Mod: CPTII,S$GLB,, | Performed by: NURSE PRACTITIONER

## 2022-03-10 PROCEDURE — 3074F SYST BP LT 130 MM HG: CPT | Mod: CPTII,S$GLB,, | Performed by: NURSE PRACTITIONER

## 2022-03-10 PROCEDURE — 93000 ELECTROCARDIOGRAM COMPLETE: CPT | Mod: S$GLB,,, | Performed by: PEDIATRICS

## 2022-03-10 RX ORDER — LETROZOLE 2.5 MG/1
2.5 TABLET, FILM COATED ORAL DAILY
COMMUNITY
Start: 2022-02-28

## 2022-03-10 NOTE — PATIENT INSTRUCTIONS
Brooks Conroy MD  Pediatric Cardiology  [address] [place] [id number]  [phone]    General Guidelines    Name: Belle Roldan                   : 1995    Diagnosis:   1. Ventricular tachycardia    2. Palpitations        PCP: Julian Bishop MD  PCP Phone Number: [phone]    If you have an emergency or you think you have an emergency, go to the nearest emergency room!     Breathing too fast, doesnt look right, consistently not eating well, your child needs to be checked. These are general indications that your child is not feeling well. This may be caused by anything, a stomach virus, an ear ache or heart disease, so please call Julian Bishop MD. If Julian Bishop MD thinks you need to be checked for your heart, they will let us know.     If your child experiences a rapid or very slow heart rate and has the following symptoms, call Julian Bishop MD or go to the nearest emergency room.   unexplained chest pain   does not look right   feels like they are going to pass out   actually passes out for unexplained reasons   weakness or fatigue   shortness of breath  or breathing fast   consistent poor feeding     If your child experiences a rapid or very slow heart rate that lasts longer than 30 minutes call Julian Bishop MD or go to the nearest emergency room.     If your child feels like they are going to pass out - have them sit down or lay down immediately. Raise the feet above the head (prop the feet on a chair or the wall) until the feeling passes. Slowly allow the child to sit, then stand. If the feeling returns, lay back down and start over.     It is very important that you notify Julian Bishop MD first. Julian Bishop MD or the ER Physician can reach Dr. Brooks Conroy at the office or through [facility] PICU at [phone] as needed.    Call our office ([phone]) one week after ALL tests for results.

## 2022-03-10 NOTE — PROGRESS NOTES
"[facility]  Belle Roldan  1995    Belle Roldan is a 26 y.o. female presenting for follow-up of VT, s/p ablation, CRBBB, dysautonomia, Hx of Covid, and family hx of CAD.  Belle is here today unaccompanied.     HPI  Belle Roldan was initially sent for cardiac evaluation in 2006 with a history of PVC's and VT s/p ablation attempt by Dr. Kellogg.  She also had ablation attempt in 2007 at [facility] with loop recorder placement.  She has subsequently been managed on Sotalol.  Loop recorder was removed in 2012. Most recent echo was normal. She saw Dr. Serrato in June of 2021 and discussed stopping the sotalol since it is such a low dose but Belle stated that when she does not take the sotalol, her heart races. She has 1 year f/u.     She was last seen in October 2021 and at that time  was doing well with no complaints. Her exam that day revealed normal heart sounds and no murmur. Covid antibody was ordered and she was asked to f/u in one year.     She was diagnosed with Covid on 1/4/22. 104 temp for 4 days. She had fluttering off and on since then and avoided a Holter thinking they would resolve. She finally had a Holter on 1/28/22 (normal) and had no further symptoms once it was applied. She was asked to f/u in 8 weeks d/t Covid and palpitations.     Belle has been doing well since then. She is doing better with less job stress. She still has fluttering about 2 times per week but "they don't feel scary like VT." She is avoiding caffeine and has learned to control her anxiety. She has recently increased her fluid intake from 90 to 120 oz/day which has made a lot of difference with her POTS symptoms. Denies any recent illness, surgeries, or hospitalizations.    There are no reports of chest pain, chest pain with exertion, cyanosis, exercise intolerance, dyspnea, syncope and tachypnea. No other cardiovascular or medical concerns are reported.     Current Medications:   Current " C/O neck pain for 2 weeks.  Has seen ortho for same.  States her medication is not working.   Outpatient Medications on File Prior to Visit   Medication Sig Dispense Refill    dexmethylphenidate (FOCALIN) 10 MG tablet Take 10 mg by mouth 2 (two) times daily.      letrozole (FEMARA) 2.5 mg Tab Take 2.5 mg by mouth once daily.      linaCLOtide (LINZESS) 145 mcg Cap capsule Take 145 mcg by mouth daily as needed.       OMEGA-3 FATTY ACIDS/FISH OIL (OMEGA 3 FISH OIL ORAL) Take 1 capsule by mouth once daily.      sotaloL (BETAPACE) 80 MG tablet TAKE 1/2 TABLET BY MOUTH EVERY NIGHT AT BEDTIME 45 tablet 2     No current facility-administered medications on file prior to visit.     Allergies:   Review of patient's allergies indicates:   Allergen Reactions    Amoxicillin-pot clavulanate     Iodine     Sulfamethoxazole-trimethoprim     Buspar [buspirone] Photosensitivity and Other (See Comments)    Dye          Family History   Problem Relation Age of Onset    Fibromyalgia Mother     Meniere's disease Mother     Hypertension Father     Stroke Father 50    Hypertension Maternal Grandmother     Heart attacks under age 50 Maternal Grandfather     Coronary artery disease Maternal Grandfather         bypass    Diabetes Maternal Grandfather     Hypertension Maternal Grandfather     Arrhythmia Maternal Aunt         PVCs    Arrhythmia Other         PVCs    No Known Problems Child     Cardiomyopathy Neg Hx     Congenital heart disease Neg Hx     Early death Neg Hx     Long QT syndrome Neg Hx     Pacemaker/defibrilator Neg Hx      Past Medical History:   Diagnosis Date    Blood clot in vein 2008    history of right femoral vein clot     Complete right bundle branch block     COVID-19 01/04/2022    Dysautonomia     Family history of MI (myocardial infarction)     MGF- s/p bypass    Miscarriage 03/2019    Stress at home     VT (ventricular tachycardia)     s/p multiple ablations     Social History     Socioeconomic History    Marital status:    Tobacco Use    Smoking status: Never Smoker  "  Substance and Sexual Activity    Alcohol use: No    Drug use: No    Sexual activity: Yes     Partners: Male   Social History Narrative    Two children: step child age-12 and her child age- 6. Working at [employer].      Past Surgical History:   Procedure Laterality Date    CARDIAC ELECTROPHYSIOLOGY MAPPING AND ABLATION  05/20/2008    ACH: 13 complete RF lesions > 20 seconds and partial success    CARDIAC ELECTROPHYSIOLOGY MAPPING AND ABLATION  08/25/2006    [facility]: RVOT VT, and had multiple lesions ablated    DILATION AND CURETTAGE OF UTERUS  03/2019    LOOP RECORDER IMPLANT  05/20/2008    ACH: Loop recorder implant    LOOP RECORDER REMOVAL  01/2012    [facility]: Loop recorder removal       Review of Systems   Cardiovascular: Positive for palpitations.   Psychiatric/Behavioral: The patient is nervous/anxious.    All other systems reviewed and are negative.    Objective:   /64 (BP Location: Right arm, Patient Position: Sitting, BP Method: Large (Manual))   Pulse (!) 53   Resp 20   Ht 5' 4.57" (1.64 m)   Wt 82.4 kg (181 lb 10.5 oz)   SpO2 97%   BMI 30.64 kg/m²     Physical Exam  GENERAL: Awake, well-developed well-nourished, no apparent distress  HEENT: mucous membranes moist and pink, normocephalic, no cranial or carotid bruits, sclera anicteric  CHEST: Good air movement, clear to auscultation bilaterally  CARDIOVASCULAR: Quiet precordium, regular rhythm, single S1, split S2, normal P2, No S3 or S4, no rubs or gallops. No clicks or rumbles. No cardiomegaly by palpation. No murmur.   ABDOMEN: Soft, nontender nondistended, no hepatosplenomegaly, no aortic bruits  EXTREMITIES: Warm well perfused, 2+ brachial/femoral pulses, capillary refill <3 seconds, no clubbing, cyanosis, or edema  NEURO: Alert and oriented, cooperative with exam, face symmetric, moves all extremities well.    Tests:   Today's EKG interpretation by Dr. Conroy reveals:   Sinus Rhythm   RBBB  Low voltage  (Final report " in electronic medical record)    Echocardiogram:   Pertinent findings from the Echo dated 10/6/20 are:   There are 4 chambers with normally aligned great vessels.  Chamber sizes are qualitatively normal.  There is good LV function.  There are no shunts noted.  Physiological TR, PI.  The right coronary artery and left coronary are patent by 2D.  LA Volume 10 ml/m2  RVSP 16 mmHg  LV lateral tissue doppler data WNL  TAPSE 2.3 cm  No cardiac disease identified on this study  Clinical Correlation Suggested  Follow Up Warranted  (Full report in electronic medical record)    Holter/Event results from 1/28/22 are:  48 hour study. Avg HR 63, max , Min HR 39, SB.  Predominant rhythm was sinus/IVCD with intermittent sinus arrhythmia.  No supraventricular ectopic beats, no ventricular ectopic beats, no heart block, no pauses greater than 2 seconds.  Normal Holter.        Assessment:  1. Ventricular tachycardia    2. Palpitations    3. Complete right bundle branch block    4. S/P radiofrequency ablation operation for arrhythmia    5. Family history of early CAD    6. Family history of heart attack          Discussion/Plan:   Belle Roldan is a 26 y.o. female with VT s/p ablation attempt. She is controlled with Sotolol 40 mg q pm and has increased HR if she stops it. She has dysautonomia but is doing well on 20 oz of fluid per day.  She has significant anxiety at times but has developed some coping mechanisms that are becoming more effective.  She has gained more weight recently than she would like on a new hormone treatment.  We will continue the sotalol for now and see her in 1 year pending course.  She knows to call with any concerns or complaints.    I have reviewed our general guidelines related to cardiac issues with the family.  I instructed them in the event of an emergency to call 911 or go to the nearest emergency room.  They know to contact the PCP if problems arise or if they are in doubt. The patient should see  a dentist every 6 months for routine dental care.    Follow up with the primary care provider for the following issues: Nothing identified.    Activity:She can participate in normal age-appropriate activities. She should be allowed to set her own pace and rest if fatigued.    No endocarditis prophylaxis is recommended in this circumstance.     I spent over 30 minutes with the patient. Over 50% of the time was spent counseling the patient and family member.    Patient or family member was asked to call the office within 3 days of any testing for results.     Dr. Conroy reviewed history and physical exam. He then performed the physical exam. He discussed the findings with the patient's caregiver(s), and answered all questions. I have reviewed our general guidelines related to cardiac issues with the family. I instructed them in the event of an emergency to call 911 or go to the nearest emergency room. They know to contact the PCP if problems arise or if they are in doubt.    Medications:   Current Outpatient Medications   Medication Sig    dexmethylphenidate (FOCALIN) 10 MG tablet Take 10 mg by mouth 2 (two) times daily.    letrozole (FEMARA) 2.5 mg Tab Take 2.5 mg by mouth once daily.    linaCLOtide (LINZESS) 145 mcg Cap capsule Take 145 mcg by mouth daily as needed.     OMEGA-3 FATTY ACIDS/FISH OIL (OMEGA 3 FISH OIL ORAL) Take 1 capsule by mouth once daily.    sotaloL (BETAPACE) 80 MG tablet TAKE 1/2 TABLET BY MOUTH EVERY NIGHT AT BEDTIME     No current facility-administered medications for this visit.        Orders:   No orders of the defined types were placed in this encounter.    Follow-Up:     Return to clinic in one year with EKG or sooner if there are any concerns.       Sincerely,  Brooks Conroy MD    Note Contributing Authors:  MD Levon Montero, RITUP-C  This documentation was created using [employer] voice recognition software. Content is subject to voice recognition  errors.    03/11/2022    Attestation: Brooks Conroy MD    I have reviewed the records and agree with the above.

## 2022-03-28 DIAGNOSIS — I47.20 VT (VENTRICULAR TACHYCARDIA): Primary | ICD-10-CM

## 2022-03-28 DIAGNOSIS — G90.1 DYSAUTONOMIA: ICD-10-CM

## 2022-03-28 DIAGNOSIS — I49.1 PAC (PREMATURE ATRIAL CONTRACTION): ICD-10-CM

## 2022-03-28 DIAGNOSIS — R00.2 PALPITATION: ICD-10-CM

## 2022-03-28 DIAGNOSIS — I49.3 PVC (PREMATURE VENTRICULAR CONTRACTION): ICD-10-CM

## 2022-03-28 DIAGNOSIS — R55 VASOVAGAL SYNCOPE: ICD-10-CM

## 2022-03-28 DIAGNOSIS — I45.10 COMPLETE RIGHT BUNDLE BRANCH BLOCK: ICD-10-CM

## 2022-03-28 DIAGNOSIS — R53.83 FATIGUE, UNSPECIFIED TYPE: ICD-10-CM

## 2022-03-29 RX ORDER — SOTALOL HYDROCHLORIDE 80 MG/1
TABLET ORAL
Qty: 45 TABLET | Refills: 2 | Status: SHIPPED | OUTPATIENT
Start: 2022-03-29 | End: 2022-12-27 | Stop reason: SDUPTHER

## 2022-03-29 NOTE — TELEPHONE ENCOUNTER
Please refill the sotolol at [facility], she is basically out she says.  I think the request went to Levon's inbox.

## 2022-12-09 ENCOUNTER — CLINICAL SUPPORT (OUTPATIENT)
Dept: PEDIATRIC CARDIOLOGY | Facility: CLINIC | Age: 27
End: 2022-12-09
Attending: NURSE PRACTITIONER
Payer: COMMERCIAL

## 2022-12-09 ENCOUNTER — TELEPHONE (OUTPATIENT)
Dept: PEDIATRIC CARDIOLOGY | Facility: CLINIC | Age: 27
End: 2022-12-09
Payer: COMMERCIAL

## 2022-12-09 ENCOUNTER — DOCUMENTATION ONLY (OUTPATIENT)
Dept: PEDIATRIC CARDIOLOGY | Facility: CLINIC | Age: 27
End: 2022-12-09

## 2022-12-09 DIAGNOSIS — R00.2 PALPITATIONS: ICD-10-CM

## 2022-12-09 DIAGNOSIS — Z86.79 S/P RADIOFREQUENCY ABLATION OPERATION FOR ARRHYTHMIA: ICD-10-CM

## 2022-12-09 DIAGNOSIS — Z98.890 S/P RADIOFREQUENCY ABLATION OPERATION FOR ARRHYTHMIA: ICD-10-CM

## 2022-12-09 DIAGNOSIS — I47.20 VT (VENTRICULAR TACHYCARDIA): ICD-10-CM

## 2022-12-09 PROCEDURE — 93242 CV 3-14 DAY PEDIATRIC HOLTER MONITOR (CUPID ONLY): ICD-10-PCS | Mod: ,,, | Performed by: PEDIATRICS

## 2022-12-09 PROCEDURE — 93242 EXT ECG>48HR<7D RECORDING: CPT | Mod: ,,, | Performed by: PEDIATRICS

## 2022-12-09 PROCEDURE — 93244 CV 3-14 DAY PEDIATRIC HOLTER MONITOR (CUPID ONLY): ICD-10-PCS | Mod: ,,, | Performed by: PEDIATRICS

## 2022-12-09 PROCEDURE — 93244 EXT ECG>48HR<7D REV&INTERPJ: CPT | Mod: ,,, | Performed by: PEDIATRICS

## 2022-12-09 NOTE — TELEPHONE ENCOUNTER
Patient called this a.m..  She was seen in the [facility] ER last night for palpitations.  She states he had just taken her sotalol and was in bed approximately 10 20.  She had racing of the heart like she had never felt before.  She tried to break the rhythm with cold water, rub in her sternum and then decided to go to the ER.  The duration was approximately 5 minutes and resolve before the ER visit.  She did report palpitations a couple minutes yesterday around 1:00 p.m..  She had diarrhea at the hospital she was shaking, her ears were hot.  She questions whether she was panicking because she really felt like she was going to die.  Her potassium was 3.1.  She was given some oral potassium to take.  She is taken 1/2 of 1 of the tablets wanted are patent whether to take.  All week she has felt bad with position changes and her fluid intake has not been what it used to be.    We will check potassium again and get a 3 day Holter.

## 2022-12-09 NOTE — PROGRESS NOTES
Pt here for Holter and lab order. Holter applied and lab order given. Dr. Conroy and I spoke with her at length. He listened to her chest. Heart sounds were normal with constantly split S2. HR 72 b/p 102/64, 97 % saturation. She admitted to some episodes of hyperventilation, tingling fingers. Will have BMP today and f/u here Wed next week. Dr. Conroy instructed her to not take the potassium tablets but supplement her diet with bananas and OJ. She was given 5/2/5 breathing instructions. ? Atrial flutter?    Dr. Conroy read EKG from ER visit. NSR, RBBB, no change.

## 2022-12-12 DIAGNOSIS — R00.2 PALPITATION: ICD-10-CM

## 2022-12-12 DIAGNOSIS — I45.10 COMPLETE RIGHT BUNDLE BRANCH BLOCK: Primary | ICD-10-CM

## 2022-12-14 ENCOUNTER — OFFICE VISIT (OUTPATIENT)
Dept: PEDIATRIC CARDIOLOGY | Facility: CLINIC | Age: 27
End: 2022-12-14
Payer: COMMERCIAL

## 2022-12-14 VITALS
OXYGEN SATURATION: 97 % | HEART RATE: 67 BPM | DIASTOLIC BLOOD PRESSURE: 62 MMHG | WEIGHT: 181.56 LBS | HEIGHT: 65 IN | BODY MASS INDEX: 30.25 KG/M2 | SYSTOLIC BLOOD PRESSURE: 116 MMHG | RESPIRATION RATE: 18 BRPM

## 2022-12-14 DIAGNOSIS — I47.20 VT (VENTRICULAR TACHYCARDIA): ICD-10-CM

## 2022-12-14 DIAGNOSIS — R00.2 PALPITATION: ICD-10-CM

## 2022-12-14 DIAGNOSIS — I45.10 COMPLETE RIGHT BUNDLE BRANCH BLOCK: ICD-10-CM

## 2022-12-14 DIAGNOSIS — G90.1 DYSAUTONOMIA: ICD-10-CM

## 2022-12-14 DIAGNOSIS — Z82.49 FAMILY HISTORY OF HEART ATTACK: ICD-10-CM

## 2022-12-14 DIAGNOSIS — Z82.49 FAMILY HISTORY OF EARLY CAD: ICD-10-CM

## 2022-12-14 DIAGNOSIS — Z98.890 S/P RADIOFREQUENCY ABLATION OPERATION FOR ARRHYTHMIA: ICD-10-CM

## 2022-12-14 DIAGNOSIS — Z86.79 S/P RADIOFREQUENCY ABLATION OPERATION FOR ARRHYTHMIA: ICD-10-CM

## 2022-12-14 PROCEDURE — 99214 OFFICE O/P EST MOD 30 MIN: CPT | Mod: 25,S$GLB,, | Performed by: NURSE PRACTITIONER

## 2022-12-14 PROCEDURE — 3008F PR BODY MASS INDEX (BMI) DOCUMENTED: ICD-10-PCS | Mod: CPTII,S$GLB,, | Performed by: NURSE PRACTITIONER

## 2022-12-14 PROCEDURE — 99214 PR OFFICE/OUTPT VISIT, EST, LEVL IV, 30-39 MIN: ICD-10-PCS | Mod: 25,S$GLB,, | Performed by: NURSE PRACTITIONER

## 2022-12-14 PROCEDURE — 3074F PR MOST RECENT SYSTOLIC BLOOD PRESSURE < 130 MM HG: ICD-10-PCS | Mod: CPTII,S$GLB,, | Performed by: NURSE PRACTITIONER

## 2022-12-14 PROCEDURE — 3078F PR MOST RECENT DIASTOLIC BLOOD PRESSURE < 80 MM HG: ICD-10-PCS | Mod: CPTII,S$GLB,, | Performed by: NURSE PRACTITIONER

## 2022-12-14 PROCEDURE — 93000 EKG 12-LEAD: ICD-10-PCS | Mod: S$GLB,,, | Performed by: PEDIATRICS

## 2022-12-14 PROCEDURE — 1160F RVW MEDS BY RX/DR IN RCRD: CPT | Mod: CPTII,S$GLB,, | Performed by: NURSE PRACTITIONER

## 2022-12-14 PROCEDURE — 1159F MED LIST DOCD IN RCRD: CPT | Mod: CPTII,S$GLB,, | Performed by: NURSE PRACTITIONER

## 2022-12-14 PROCEDURE — 1159F PR MEDICATION LIST DOCUMENTED IN MEDICAL RECORD: ICD-10-PCS | Mod: CPTII,S$GLB,, | Performed by: NURSE PRACTITIONER

## 2022-12-14 PROCEDURE — 3078F DIAST BP <80 MM HG: CPT | Mod: CPTII,S$GLB,, | Performed by: NURSE PRACTITIONER

## 2022-12-14 PROCEDURE — 93000 ELECTROCARDIOGRAM COMPLETE: CPT | Mod: S$GLB,,, | Performed by: PEDIATRICS

## 2022-12-14 PROCEDURE — 3074F SYST BP LT 130 MM HG: CPT | Mod: CPTII,S$GLB,, | Performed by: NURSE PRACTITIONER

## 2022-12-14 PROCEDURE — 1160F PR REVIEW ALL MEDS BY PRESCRIBER/CLIN PHARMACIST DOCUMENTED: ICD-10-PCS | Mod: CPTII,S$GLB,, | Performed by: NURSE PRACTITIONER

## 2022-12-14 PROCEDURE — 3008F BODY MASS INDEX DOCD: CPT | Mod: CPTII,S$GLB,, | Performed by: NURSE PRACTITIONER

## 2022-12-14 NOTE — PATIENT INSTRUCTIONS
Brooks Conroy MD  Pediatric Cardiology  [address] [place] [address]  [phone]    General Guidelines    Name: Belle Roldan                   : 1995    Diagnosis:   1. Dysautonomia    2. Complete right bundle branch block    3. S/P radiofrequency ablation operation for arrhythmia    4. Palpitation    5. Family history of early CAD    6. VT (ventricular tachycardia)    7. Family history of heart attack        PCP: Sam Ac MD  PCP Phone Number: [phone]    If you have an emergency or you think you have an emergency, go to the nearest emergency room!     Breathing too fast, doesnt look right, consistently not eating well, your child needs to be checked. These are general indications that your child is not feeling well. This may be caused by anything, a stomach virus, an ear ache or heart disease, so please call Sam Ac MD. If Sam Ac MD thinks you need to be checked for your heart, they will let us know.     If your child experiences a rapid or very slow heart rate and has the following symptoms, call Sam Ac MD or go to the nearest emergency room.   unexplained chest pain   does not look right   feels like they are going to pass out   actually passes out for unexplained reasons   weakness or fatigue   shortness of breath  or breathing fast   consistent poor feeding     If your child experiences a rapid or very slow heart rate that lasts longer than 30 minutes call Sam Ac MD or go to the nearest emergency room.     If your child feels like they are going to pass out - have them sit down or lay down immediately. Raise the feet above the head (prop the feet on a chair or the wall) until the feeling passes. Slowly allow the child to sit, then stand. If the feeling returns, lay back down and start over.     It is very important that you notify Sam Ac MD first. Sam Ac MD or the ER Physician can reach Dr. Brooks Conroy  at the office or through [facility] at [phone] as needed.    Call our office ([phone]) one week after ALL tests for results.

## 2022-12-14 NOTE — PROGRESS NOTES
"[facility]  Belle Roldan  1995    Belle Roldan is a 27 y.o. female presenting for follow-up of recent ER visit for palpitations.  Belle is here today alone.     HPI  Belle Roldan was initially sent for cardiac evaluation in  2006 with a history of PVC's and VT.  She had frequent cardiac ectopy during a physical exam for sinus surgery in May of 2006.  Holter revealed a lot of premature beats, junctional versus PVC, and 1 suspect 3 beat VT run.  She was hospitalized and monitored, she had PVCs greater than 800 per hour.  She had an EP study by Dr. Kellogg in August of 2006.  Diagnosed with RVOT VT and had multiple lesions ablated with no PVCs noted post ablation. She was hospitalized again in May 2007 with a 3 beat run of VT on Holter.  In May of 2008 she had syncope with possible VT episode.  She was transferred to [facility].  She had an EP study in May of 2008 with 13 complete RF lesions greater than 20 seconds and partial success.  She had a loop recorder at that time.  She has subsequently been managed on Sotalol.  Loop recorder was removed in 2012. Most recent echo was normal. She saw Dr. Serrato in June of 2021 and discussed stopping the sotalol since it is such a low dose but Belle stated that when she does not take the sotalol, her heart races.    She was diagnosed with Covid on 1/4/22. 104 temp for 4 days. She had fluttering off and on for a while and avoided a Holter thinking they would resolve. She finally had a Holter on 1/28/22 (normal) and had no further symptoms once it was applied.     She was last seen for a visit in March of 2022 and at that time was doing well. She c/o fluttering about 2 times per week but "they don't feel scary like VT." She was avoiding caffeine and had learned to control her anxiety. She had increased her fluid intake from 90 to 120 oz/day which had made a lot of difference with her symptoms. Her exam that day revealed normal heart sounds and " no murmur. RBBB and low voltage on EKG. Sotalol was continued and she was asked to f/u in one year.     She called the office on 12/09/2022.  She was seen in the [facility] ER the previous night for palpitations that felt like she had never felt before.   She could not break the rhythm.  The duration was approximately 5 minutes and resolved for she made it to the ER.  She had some diarrhea.  Potassium was 3.1 and she was given some oral potassium to take.  We brought her in for Holter.  Dr. Conroy and I spoke with her at length.  He listened to her chest.  She admitted some episodes of hyperventilation, tingling fingers.  BNP was ordered, she was encouraged to increase the potassium in her diet and not to take the oral potassium, she was given the 5-2-5 breathing instructions and asked to follow-up today.  Potassium was normal at 4.0. on 12/9/22Thomas Odonnell feels like she feels like herself starting today. She feels like her episode was a combination of factors coming together including a recent GI illness and poor fluid intake. She has increased her fluid intake and is eating potassium foods. She had a few minor episodes while she had the monitor on which she documented in her diary.     Tracking b/p and HR  110/65  56  102/55  79  106/61  76  106/65  60  107/64  82  106/70  69  117/67  59    There are no reports of chest pain, chest pain with exertion, cyanosis, exercise intolerance, dyspnea, fatigue, palpitations, syncope, and tachypnea. No other cardiovascular or medical concerns are reported.     Current Medications:   Current Outpatient Medications on File Prior to Visit   Medication Sig Dispense Refill    dexmethylphenidate (FOCALIN) 10 MG tablet Take 10 mg by mouth 2 (two) times daily.      letrozole (FEMARA) 2.5 mg Tab Take 2.5 mg by mouth once daily.      linaCLOtide (LINZESS) 145 mcg Cap capsule Take 145 mcg by mouth daily as needed.       OMEGA-3 FATTY ACIDS/FISH OIL (OMEGA 3 FISH OIL ORAL)  Take 1 capsule by mouth once daily.      sotaloL (BETAPACE) 80 MG tablet TAKE 1/2 TABLET BY MOUTH EVERY NIGHT AT BEDTIME 45 tablet 2     No current facility-administered medications on file prior to visit.     Allergies:   Review of patient's allergies indicates:   Allergen Reactions    Amoxicillin-pot clavulanate     Iodine     Sulfamethoxazole-trimethoprim     Buspar [buspirone] Photosensitivity and Other (See Comments)    Dye          Family History   Problem Relation Age of Onset    Fibromyalgia Mother     Meniere's disease Mother     Hypertension Father     Stroke Father 50    Hypertension Maternal Grandmother     Heart attacks under age 50 Maternal Grandfather     Coronary artery disease Maternal Grandfather         bypass    Diabetes Maternal Grandfather     Hypertension Maternal Grandfather     Arrhythmia Maternal Aunt         PVCs    Arrhythmia Other         PVCs    No Known Problems Child     Cardiomyopathy Neg Hx     Congenital heart disease Neg Hx     Early death Neg Hx     Long QT syndrome Neg Hx     Pacemaker/defibrilator Neg Hx      Past Medical History:   Diagnosis Date    Blood clot in vein 2008    history of right femoral vein clot     Complete right bundle branch block     COVID-19 01/04/2022    Dysautonomia     Family history of MI (myocardial infarction)     MGF- s/p bypass    Miscarriage 03/2019    Stress at home     VT (ventricular tachycardia)     s/p multiple ablations     Social History     Socioeconomic History    Marital status:    Tobacco Use    Smoking status: Never   Substance and Sexual Activity    Alcohol use: No    Drug use: No    Sexual activity: Yes     Partners: Male   Social History Narrative    Two children: step child age-12 and her child age- 6. Working at [employer].      Past Surgical History:   Procedure Laterality Date    CARDIAC ELECTROPHYSIOLOGY MAPPING AND ABLATION  05/20/2008    ACH: 13 complete RF lesions > 20 seconds and partial success    CARDIAC  "ELECTROPHYSIOLOGY MAPPING AND ABLATION  08/25/2006    [facility]: RVOT VT, and had multiple lesions ablated    DILATION AND CURETTAGE OF UTERUS  03/2019    LOOP RECORDER IMPLANT  05/20/2008    ACH: Loop recorder implant    LOOP RECORDER REMOVAL  01/2012    [facility]: Loop recorder removal       Review of Systems    GENERAL: No fever, chills, fatigability, malaise  or weight loss.  CHEST: Denies dyspnea on exertion, cyanosis, wheezing, cough, sputum production   CARDIOVASCULAR: Denies chest pain, palpitations, diaphoresis,  or reduced exercise tolerance.  ABDOMEN: Appetite normal. Denies diarrhea, abdominal pain, nausea or vomiting.  PERIPHERAL VASCULAR: No edema or cyanosis.  NEUROLOGIC: no dizziness, no syncope , no headache   MUSCULOSKELETAL: Denies muscle weakness, joint pain  PSYCHOLOGICAL/BEHAVIORAL: Denies anxiety, severe stress, confusion  SKIN: no rashes, lesions  HEMATOLOGIC: Denies any abnormal bruising or bleeding  ALLERGY/IMMUNOLOGIC: Denies any environmental allergies.     Objective:   /62 (BP Location: Right arm, Patient Position: Lying, BP Method: Medium (Manual))   Pulse 67   Resp 18   Ht 5' 4.96" (1.65 m)   Wt 82.3 kg (181 lb 8.8 oz)   SpO2 97%   BMI 30.25 kg/m²     Growth percentile Smart[employer] can only be used for patients less than 20 years old.    Physical Exam  GENERAL: Awake, well-developed well-nourished, no apparent distress  HEENT: mucous membranes moist and pink, normocephalic, no cranial or carotid bruits, sclera anicteric  CHEST: Good air movement, clear to auscultation bilaterally  CARDIOVASCULAR: Quiet precordium, regular rhythm, single S1, split S2, normal P2, No S3 or S4, no rub. No clicks or rumbles. No cardiomegaly by palpation. No murmur.   ABDOMEN: Soft, nontender nondistended, no hepatosplenomegaly, no aortic bruits  EXTREMITIES: Warm well perfused, 2+ brachial/femoral pulses, capillary refill <3 seconds, no clubbing, cyanosis, or edema  NEURO: Alert and oriented, " cooperative with exam, face symmetric, moves all extremities well.    Tests:   Today's EKG interpretation by Dr. Conroy reveals:   Sinus Rhythm and There is an rsR' pattern in V1  RBBB  (Final report in electronic medical record)    Echocardiogram:   Pertinent findings from the Echo dated 10/6/20 are:   There are 4 chambers with normally aligned great vessels.  Chamber sizes are qualitatively normal.  There is good LV function.  There are no shunts noted.  Physiological TR, PI.  The right coronary artery and left coronary are patent by 2D.  LA Volume 10 ml/m2  RVSP 16 mmHg  LV lateral tissue doppler data WNL  TAPSE 2.3 cm  No cardiac disease identified on this study  Clinical Correlation Suggested  Follow Up Warranted  (Full report in electronic medical record)     Holter/Event results from 1/28/22 are:  48 hour study. Avg HR 63, max , Min HR 39, SB.  Predominant rhythm was sinus/IVCD with intermittent sinus arrhythmia.  No supraventricular ectopic beats, no ventricular ectopic beats, no heart block, no pauses greater than 2 seconds.  Normal Holter.      Assessment:  1. Dysautonomia    2. Complete right bundle branch block    3. S/P radiofrequency ablation operation for arrhythmia    4. Palpitation    5. Family history of early CAD    6. VT (ventricular tachycardia)    7. Family history of heart attack        Discussion/Plan:   Belle Roldan is a 27 y.o. female with dysautonomia, a history of VT status post 2 ablations on sotalol, complete right bundle-branch block, frequent palpitations, and family history of early coronary artery disease and MI. she feels like a recent episode was due to her falling off the dysautonomia protocol.  She is drinking more and feels more like herself now.  She can see symptoms which she should have noted but did not before the episode.  We are awaiting results of her 3 day Holter.  Pending Holter results will plan to follow her as needed and in 1 year.  She knows to call with any  concerns.  She had a low potassium at the ER but the follow-up level was 4.0.  She was encouraged to continue high potassium foods.    Belle has a history that is consistent with dysautonomia, specifically POTS and orthostatic hypotension. This condition is very common in teenagers and is multifactorial; symptoms include dizziness, loss of consciousness, headaches, nausea, brain fog, palpitations, exercise intolerance, fatigue, weakness, dyspnea, visual disturbances, etc. Some common contributing factors include stress, inadequate sleep, inadequate fluid intake, excessive caffeine, and poor eating habits. Dysautonomia treatment includes lifestyle adjustments: increased fluid intake - 60-80 ounces or more of clear noncaffeineated fluids, increased sodium intake, avoid skipping meals, sleep 10-14 hours per day, keep screens out of bedroom at night, 30-60 minutes of relaxing activity prior to bedtime, avoid caffeine. If symptoms do not improve with these modifications, the head of bed may need to be elevated by 4 inches, compression stockings may need to be worn, and an exercise program for reconditioning may be indicated. Psychologic treatment is also important.     I have reviewed our general guidelines related to cardiac issues with the family.  I instructed them in the event of an emergency to call 911 or go to the nearest emergency room.  They know to contact the PCP if problems arise or if they are in doubt. The patient should see a dentist every 6 months for routine dental care.    Follow up with the primary care provider for the following issues: Nothing identified.    Activity:She can participate in normal age-appropriate activities. She should be allowed to set her own pace and rest if fatigued.    No endocarditis prophylaxis is recommended in this circumstance.     I spent over 30 minutes with the patient. Over 50% of the time was spent counseling the patient and family member.    Patient or family member  was asked to call the office within 3 days of any testing for results.     Dr. Conroy reviewed history and physical exam. He then performed the physical exam. He discussed the findings with the patient's caregiver(s), and answered all questions. I have reviewed our general guidelines related to cardiac issues with the family. I instructed them in the event of an emergency to call 911 or go to the nearest emergency room. They know to contact the PCP if problems arise or if they are in doubt.    Medications:   Current Outpatient Medications   Medication Sig    dexmethylphenidate (FOCALIN) 10 MG tablet Take 10 mg by mouth 2 (two) times daily.    letrozole (FEMARA) 2.5 mg Tab Take 2.5 mg by mouth once daily.    linaCLOtide (LINZESS) 145 mcg Cap capsule Take 145 mcg by mouth daily as needed.     OMEGA-3 FATTY ACIDS/FISH OIL (OMEGA 3 FISH OIL ORAL) Take 1 capsule by mouth once daily.    sotaloL (BETAPACE) 80 MG tablet TAKE 1/2 TABLET BY MOUTH EVERY NIGHT AT BEDTIME     No current facility-administered medications for this visit.        Orders:   No orders of the defined types were placed in this encounter.    Follow-Up:     Return to clinic in one year with EKG or sooner if there are any concerns.       Sincerely,  Brooks Conroy MD    Note Contributing Authors:  MD Levon Montero FNP-ELVIN  This documentation was created using [employer] voice recognition software. Content is subject to voice recognition errors.    12/15/2022    Attestation: Brooks Conroy MD    I have reviewed the records and agree with the above.

## 2022-12-26 LAB
OHS CV EVENT MONITOR DAY: 2
OHS CV HOLTER HOOKUP DATE: NORMAL
OHS CV HOLTER HOOKUP TIME: NORMAL
OHS CV HOLTER LENGTH DECIMAL HOURS: 69
OHS CV HOLTER LENGTH HOURS: 21
OHS CV HOLTER LENGTH MINUTES: 0
OHS CV HOLTER SCAN DATE: NORMAL
OHS CV HOLTER SINUS AVERAGE HR: 58 BPM
OHS CV HOLTER SINUS MAX HR: 124 BPM
OHS CV HOLTER SINUS MIN HR: 40 BPM
OHS CV HOLTER STUDY END DATE: NORMAL
OHS CV HOLTER STUDY END TIME: NORMAL

## 2022-12-27 DIAGNOSIS — I49.1 PAC (PREMATURE ATRIAL CONTRACTION): ICD-10-CM

## 2022-12-27 DIAGNOSIS — R53.83 FATIGUE, UNSPECIFIED TYPE: ICD-10-CM

## 2022-12-27 DIAGNOSIS — R00.2 PALPITATION: ICD-10-CM

## 2022-12-27 DIAGNOSIS — R55 VASOVAGAL SYNCOPE: ICD-10-CM

## 2022-12-27 DIAGNOSIS — I45.10 COMPLETE RIGHT BUNDLE BRANCH BLOCK: ICD-10-CM

## 2022-12-27 DIAGNOSIS — I47.20 VT (VENTRICULAR TACHYCARDIA): ICD-10-CM

## 2022-12-27 DIAGNOSIS — I49.3 PVC (PREMATURE VENTRICULAR CONTRACTION): ICD-10-CM

## 2022-12-27 DIAGNOSIS — G90.1 DYSAUTONOMIA: ICD-10-CM

## 2022-12-27 RX ORDER — SOTALOL HYDROCHLORIDE 80 MG/1
40 TABLET ORAL NIGHTLY
Qty: 45 TABLET | Refills: 2 | Status: SHIPPED | OUTPATIENT
Start: 2022-12-27 | End: 2023-09-18

## 2022-12-27 NOTE — TELEPHONE ENCOUNTER
----- Message from Jennifer Arreaga MA sent at 12/27/2022 11:05 AM CST -----  sotaloL (BETAPACE) 80 MG tablet    [facility] - [place], [place] - [address] [place] [address]   Phone:  [phone]  Fax:  [phone]      Thank you,    Jennifer

## 2023-01-04 ENCOUNTER — PATIENT MESSAGE (OUTPATIENT)
Dept: PEDIATRIC CARDIOLOGY | Facility: CLINIC | Age: 28
End: 2023-01-04
Payer: COMMERCIAL

## 2023-01-05 ENCOUNTER — TELEPHONE (OUTPATIENT)
Dept: PEDIATRIC CARDIOLOGY | Facility: CLINIC | Age: 28
End: 2023-01-05
Payer: COMMERCIAL

## 2023-01-05 DIAGNOSIS — I47.20 VT (VENTRICULAR TACHYCARDIA): ICD-10-CM

## 2023-01-05 DIAGNOSIS — R00.2 PALPITATION: ICD-10-CM

## 2023-01-05 DIAGNOSIS — Z86.79 S/P RADIOFREQUENCY ABLATION OPERATION FOR ARRHYTHMIA: ICD-10-CM

## 2023-01-05 DIAGNOSIS — Z98.890 S/P RADIOFREQUENCY ABLATION OPERATION FOR ARRHYTHMIA: ICD-10-CM

## 2023-01-05 NOTE — TELEPHONE ENCOUNTER
Dr. Conroy discussed recent c/o palpitations and pre  meds that have been recommended.  Will place event monitor and assess. Mg, D3, and PNV are OK.

## 2023-01-06 ENCOUNTER — CLINICAL SUPPORT (OUTPATIENT)
Dept: PEDIATRIC CARDIOLOGY | Facility: CLINIC | Age: 28
End: 2023-01-06
Attending: NURSE PRACTITIONER
Payer: COMMERCIAL

## 2023-01-06 DIAGNOSIS — R00.2 PALPITATION: ICD-10-CM

## 2023-01-06 DIAGNOSIS — Z86.79 S/P RADIOFREQUENCY ABLATION OPERATION FOR ARRHYTHMIA: ICD-10-CM

## 2023-01-06 DIAGNOSIS — I47.20 VT (VENTRICULAR TACHYCARDIA): ICD-10-CM

## 2023-01-06 DIAGNOSIS — Z98.890 S/P RADIOFREQUENCY ABLATION OPERATION FOR ARRHYTHMIA: ICD-10-CM

## 2023-01-06 PROCEDURE — 93268 CV CARDIAC EVENT MONITOR PEDIATRICS - 30 DAYS (CUPID ONLY): ICD-10-PCS | Mod: S$GLB,,, | Performed by: PEDIATRICS

## 2023-01-06 PROCEDURE — 93268 ECG RECORD/REVIEW: CPT | Mod: S$GLB,,, | Performed by: PEDIATRICS

## 2023-06-18 ENCOUNTER — PATIENT MESSAGE (OUTPATIENT)
Dept: PEDIATRIC CARDIOLOGY | Facility: CLINIC | Age: 28
End: 2023-06-18
Payer: COMMERCIAL

## 2023-06-19 DIAGNOSIS — Z98.890 S/P RADIOFREQUENCY ABLATION OPERATION FOR ARRHYTHMIA: ICD-10-CM

## 2023-06-19 DIAGNOSIS — I45.10 COMPLETE RIGHT BUNDLE BRANCH BLOCK: ICD-10-CM

## 2023-06-19 DIAGNOSIS — G90.1 DYSAUTONOMIA: ICD-10-CM

## 2023-06-19 DIAGNOSIS — I47.20 VT (VENTRICULAR TACHYCARDIA): ICD-10-CM

## 2023-06-19 DIAGNOSIS — Z86.79 S/P RADIOFREQUENCY ABLATION OPERATION FOR ARRHYTHMIA: ICD-10-CM

## 2023-06-19 DIAGNOSIS — R00.2 PALPITATION: Primary | ICD-10-CM

## 2023-06-20 ENCOUNTER — CLINICAL SUPPORT (OUTPATIENT)
Dept: PEDIATRIC CARDIOLOGY | Facility: CLINIC | Age: 28
End: 2023-06-20
Attending: NURSE PRACTITIONER
Payer: COMMERCIAL

## 2023-06-20 DIAGNOSIS — I47.20 VT (VENTRICULAR TACHYCARDIA): ICD-10-CM

## 2023-06-20 DIAGNOSIS — Z98.890 S/P RADIOFREQUENCY ABLATION OPERATION FOR ARRHYTHMIA: ICD-10-CM

## 2023-06-20 DIAGNOSIS — I45.10 COMPLETE RIGHT BUNDLE BRANCH BLOCK: ICD-10-CM

## 2023-06-20 DIAGNOSIS — G90.1 DYSAUTONOMIA: ICD-10-CM

## 2023-06-20 DIAGNOSIS — Z86.79 S/P RADIOFREQUENCY ABLATION OPERATION FOR ARRHYTHMIA: ICD-10-CM

## 2023-06-20 DIAGNOSIS — R00.2 PALPITATION: ICD-10-CM

## 2023-06-20 PROCEDURE — 93242 CV 3-14 DAY PEDIATRIC HOLTER MONITOR (CUPID ONLY): ICD-10-PCS | Mod: ,,, | Performed by: PEDIATRICS

## 2023-06-20 PROCEDURE — 93244 EXT ECG>48HR<7D REV&INTERPJ: CPT | Mod: ,,, | Performed by: PEDIATRICS

## 2023-06-20 PROCEDURE — 93244 CV 3-14 DAY PEDIATRIC HOLTER MONITOR (CUPID ONLY): ICD-10-PCS | Mod: ,,, | Performed by: PEDIATRICS

## 2023-06-20 PROCEDURE — 93242 EXT ECG>48HR<7D RECORDING: CPT | Mod: ,,, | Performed by: PEDIATRICS

## 2023-06-22 ENCOUNTER — PATIENT MESSAGE (OUTPATIENT)
Dept: PEDIATRIC CARDIOLOGY | Facility: CLINIC | Age: 28
End: 2023-06-22
Payer: COMMERCIAL

## 2023-07-11 LAB
OHS CV EVENT MONITOR DAY: 3
OHS CV HOLTER HOOKUP DATE: NORMAL
OHS CV HOLTER HOOKUP TIME: NORMAL
OHS CV HOLTER LENGTH DECIMAL HOURS: 72
OHS CV HOLTER LENGTH HOURS: 0
OHS CV HOLTER LENGTH MINUTES: 0
OHS CV HOLTER SCAN DATE: NORMAL
OHS CV HOLTER SINUS AVERAGE HR: 63 BPM
OHS CV HOLTER SINUS MAX HR: 118 BPM
OHS CV HOLTER SINUS MIN HR: 39 BPM
OHS CV HOLTER STUDY END DATE: NORMAL
OHS CV HOLTER STUDY END TIME: NORMAL

## 2023-09-18 DIAGNOSIS — I47.20 VT (VENTRICULAR TACHYCARDIA): ICD-10-CM

## 2023-09-18 DIAGNOSIS — R53.83 FATIGUE, UNSPECIFIED TYPE: ICD-10-CM

## 2023-09-18 DIAGNOSIS — I49.1 PAC (PREMATURE ATRIAL CONTRACTION): ICD-10-CM

## 2023-09-18 DIAGNOSIS — R00.2 PALPITATION: ICD-10-CM

## 2023-09-18 DIAGNOSIS — I45.10 COMPLETE RIGHT BUNDLE BRANCH BLOCK: ICD-10-CM

## 2023-09-18 DIAGNOSIS — I49.3 PVC (PREMATURE VENTRICULAR CONTRACTION): ICD-10-CM

## 2023-09-18 DIAGNOSIS — R55 VASOVAGAL SYNCOPE: ICD-10-CM

## 2023-09-18 DIAGNOSIS — G90.1 DYSAUTONOMIA: ICD-10-CM

## 2023-09-18 RX ORDER — SOTALOL HYDROCHLORIDE 80 MG/1
40 TABLET ORAL NIGHTLY
Qty: 45 TABLET | Refills: 2 | Status: SHIPPED | OUTPATIENT
Start: 2023-09-18

## 2023-11-01 ENCOUNTER — DOCUMENTATION ONLY (OUTPATIENT)
Dept: PEDIATRIC CARDIOLOGY | Facility: CLINIC | Age: 28
End: 2023-11-01
Payer: COMMERCIAL

## 2023-11-01 NOTE — PROGRESS NOTES
Dr. Conroy spoke with Belle. Requested she move to Dr. Ventura/Ruthie in [place]. Deleted recall.

## 2023-11-02 ENCOUNTER — TELEPHONE (OUTPATIENT)
Dept: PEDIATRIC CARDIOLOGY | Facility: CLINIC | Age: 28
End: 2023-11-02
Payer: COMMERCIAL

## 2023-11-02 NOTE — TELEPHONE ENCOUNTER
----- Message from Levon Church NP sent at 11/2/2023 11:04 AM CDT -----  Regarding: RE: Referral to Dr. Massey  Please send referral.  NIK  ----- Message -----  From: Alycia Pinedo RN  Sent: 11/2/2023  10:47 AM CDT  To: Levon Church NP  Subject: FW: Referral to Dr. Massey               ----- Message -----  From: Pricilla Keeys MA  Sent: 11/2/2023  10:28 AM CDT  To: [facility] Staff  Subject: Referral to Dr. Massey                 Belle called because Dr. Conroy wants her to see Dr. Massey in [place]. She said she tried calling to set up the appt but they need a referral from us first.

## 2024-06-07 ENCOUNTER — TELEPHONE (OUTPATIENT)
Dept: PEDIATRIC CARDIOLOGY | Facility: CLINIC | Age: 29
End: 2024-06-07
Payer: COMMERCIAL

## 2024-06-07 ENCOUNTER — DOCUMENTATION ONLY (OUTPATIENT)
Dept: PEDIATRIC CARDIOLOGY | Facility: CLINIC | Age: 29
End: 2024-06-07
Payer: COMMERCIAL

## 2024-06-07 NOTE — TELEPHONE ENCOUNTER
Dr. Conroy spoke with pt. Encouraged to avoid breast feeding while on Sotalol. F/u with OB for questions about breast milk.

## 2024-06-07 NOTE — TELEPHONE ENCOUNTER
----- Message from Ruma Conroy RN sent at 6/7/2024 10:50 AM CDT -----    ----- Message -----  From: Jennifer Arreaga MA  Sent: 6/7/2024  10:18 AM CDT  To: Ruma Conroy RN    Belle called stating her baby was born and they won't give her baby her Breast milk, due to her being on the Sotalol, she said they are discharging today and was told [facility] would want a 1 month F/U , but she said that's a lot to pump for a WHOLE MONTH, and t all be for nothing because she can't give it to her!       Her call back number is:[phone]    Thank you,    Jennifer

## 2024-06-17 DIAGNOSIS — R00.2 PALPITATION: ICD-10-CM

## 2024-06-17 DIAGNOSIS — I45.10 COMPLETE RIGHT BUNDLE BRANCH BLOCK: ICD-10-CM

## 2024-06-17 DIAGNOSIS — I47.20 VT (VENTRICULAR TACHYCARDIA): ICD-10-CM

## 2024-06-17 DIAGNOSIS — I49.1 PAC (PREMATURE ATRIAL CONTRACTION): ICD-10-CM

## 2024-06-17 DIAGNOSIS — I49.3 PVC (PREMATURE VENTRICULAR CONTRACTION): ICD-10-CM

## 2024-06-17 DIAGNOSIS — G90.1 DYSAUTONOMIA: ICD-10-CM

## 2024-06-17 DIAGNOSIS — R53.83 FATIGUE, UNSPECIFIED TYPE: ICD-10-CM

## 2024-06-17 DIAGNOSIS — R55 VASOVAGAL SYNCOPE: ICD-10-CM

## 2024-06-18 RX ORDER — SOTALOL HYDROCHLORIDE 80 MG/1
40 TABLET ORAL NIGHTLY
Qty: 45 TABLET | Refills: 2 | OUTPATIENT
Start: 2024-06-18

## 2024-06-18 NOTE — TELEPHONE ENCOUNTER
Tried to call Belle to update- script refill needs to come from new cardiologist but no answer. Can review with Belle when she calls back.